# Patient Record
Sex: FEMALE | Race: WHITE | Employment: UNEMPLOYED | URBAN - METROPOLITAN AREA
[De-identification: names, ages, dates, MRNs, and addresses within clinical notes are randomized per-mention and may not be internally consistent; named-entity substitution may affect disease eponyms.]

---

## 2017-07-04 NOTE — ED INITIAL ASSESSMENT (HPI)
Pt states she has had sciatica pain on and off for two years. Worse over the last few weeks, seen at [facility] recently. Pt takes motrin without relief. Scheduled to start PT 7/5/17.

## 2017-07-04 NOTE — ED PROVIDER NOTES
Patient Seen in: [facility] Emergency Department    History   Patient presents with:  Back Pain (musculoskeletal)    Stated Complaint: back pain, radiates down leg    HPI    Patient is a 21-year-old with history of back pain and obesity who presents stated in HPI.     Physical Exam   ED Triage Vitals [07/04/17 0127]  BP: 124/67  Pulse: 72  Resp: 16  Temp: 98 °F (36.7 °C)  Temp src: Temporal  SpO2: 96 %  O2 Device: None (Room air)    Current:/67   Pulse 72   Temp 98 °F (36.7 °C) (Temporal)   Resp taking these medications    predniSONE 10 MG Oral Tab  60mg QD x 3d, 40mg QD x 3d, 20mg QD x 3 days  Qty: 27 tablet Refills: 0

## 2018-01-10 NOTE — ED NOTES
Pt has had her abscess drained in November. Pt received antibiotics IV and pt refused to admit at that time. Pt has been on 2 rounds of antibiotics. Pt doesn't know if she had a fever at home.   Pt has a golf ball size abscess to the middle of her chest.

## 2018-01-10 NOTE — ED PROVIDER NOTES
Patient Seen in: [facility] Emergency Department    History   Patient presents with:  Abscess (integumentary)    Stated Complaint: ABSCESS    HPI    60-year-old presents for evaluation of a painful mass in her left chest area.   It started about 6 we size area of erythema purulence fluctuance to the left upper medial breast/chest without surrounding erythema or crepitus. No pointing. Respiratory: No distress patient speaks in full sentences  Extremities: No edema.   Neuro: Speech is normal.  No facial

## 2018-01-10 NOTE — ED INITIAL ASSESSMENT (HPI)
Pt states has an abscess to her chest since November, states was treated with antibiotics and improved. Pt states worse since Saturday.

## 2019-10-21 NOTE — ED INITIAL ASSESSMENT (HPI)
Patient complaining of redness to the abdominal folds x 1 week, with gradually increasing discomfort.

## 2019-10-21 NOTE — ED PROVIDER NOTES
Patient Seen in: [facility] Emergency Department      History   Patient presents with:  Abscess (integumentary)  Eval-G (genital)    Stated Complaint: eval g, abscess    HPI    27-year-old woman who frequently gets abscesses on her body.   She compla diagnosis)    Disposition:  Discharge  10/20/2019  8:14 pm    Follow-up:  Isabel Garcia MD  [address] [place] [address]  [address] [facility] [address]  [phone]    Schedule an appointment as soon as possible for a visit in 2 days          Medications Prescribed:  Cur

## 2019-11-14 NOTE — ED PROVIDER NOTES
Patient Seen in: [facility] Emergency Department      History   Patient presents with:  Abdomen/Flank Pain (GI/)    Stated Complaint: abd pain/ n/v    HPI    58-year-old female with a history of morbid obesity, presents to the emergency department Course     Labs Reviewed   COMP METABOLIC PANEL (14) - Abnormal; Notable for the following components:       Result Value    A/G Ratio 0.9 (*)     All other components within normal limits   LIPASE - Normal   POCT PREGNANCY, URINE   RAINBOW DRAW BLUE   [facility] referral contact information. She will be discharged home with Prilosec and Carafate. Recommend avoiding NSAIDs if possible. Also recommend follow-up with her primary care physician. MDM       Patient was screened and evaluated during this visit.

## 2019-11-14 NOTE — ED NOTES
Bedside report received from Jayesh Arauz, [facility]. Patient and family updated on plan of care, no questions at this time.

## 2019-11-14 NOTE — ED INITIAL ASSESSMENT (HPI)
Patient here with reflux pain that gets worse while eating and drinking. Patient saw her PMD and was instructed to take antacids. Patient states she vomited once 2 days ago.

## 2023-06-05 ENCOUNTER — HOSPITAL ENCOUNTER (EMERGENCY)
Age: 33
Discharge: HOME | End: 2023-06-05
Attending: EMERGENCY MEDICINE
Payer: COMMERCIAL

## 2023-06-05 VITALS
OXYGEN SATURATION: 99 % | BODY MASS INDEX: 50.02 KG/M2 | DIASTOLIC BLOOD PRESSURE: 86 MMHG | HEIGHT: 64 IN | SYSTOLIC BLOOD PRESSURE: 153 MMHG | WEIGHT: 293 LBS | RESPIRATION RATE: 16 BRPM | HEART RATE: 79 BPM

## 2023-06-05 DIAGNOSIS — B02.9 HERPES ZOSTER WITHOUT COMPLICATION: Primary | ICD-10-CM

## 2023-06-05 PROCEDURE — 99283 EMERGENCY DEPT VISIT LOW MDM: CPT

## 2023-06-05 RX ORDER — HYDROCODONE BITARTRATE AND ACETAMINOPHEN 5; 325 MG/1; MG/1
1 TABLET ORAL EVERY 6 HOURS PRN
Qty: 20 TABLET | Refills: 0 | Status: SHIPPED | OUTPATIENT
Start: 2023-06-05

## 2023-06-05 RX ORDER — GABAPENTIN 100 MG/1
100 CAPSULE ORAL 3 TIMES DAILY
Qty: 30 CAPSULE | Refills: 0 | Status: SHIPPED | OUTPATIENT
Start: 2023-06-05

## 2023-06-05 RX ORDER — PREDNISONE 50 MG/1
50 TABLET ORAL DAILY
Qty: 5 TABLET | Refills: 0 | Status: SHIPPED | OUTPATIENT
Start: 2023-06-05

## 2023-06-05 RX ORDER — ACYCLOVIR 800 MG/1
800 TABLET ORAL 4 TIMES DAILY
Qty: 40 TABLET | Refills: 0 | Status: SHIPPED | OUTPATIENT
Start: 2023-06-05

## 2023-06-05 NOTE — ED INITIAL ASSESSMENT (HPI)
Has itchy, burning rash in three spots, small of back.  rigth buttock and right inner thigh x 1 week

## 2023-10-27 ENCOUNTER — HOSPITAL ENCOUNTER (EMERGENCY)
Age: 33
Discharge: HOME | End: 2023-10-27
Payer: COMMERCIAL

## 2023-10-27 ENCOUNTER — HOSPITAL ENCOUNTER (EMERGENCY)
Age: 33
Discharge: LEFT BEFORE DISCHARGE | End: 2023-10-27
Payer: COMMERCIAL

## 2023-10-27 VITALS
SYSTOLIC BLOOD PRESSURE: 129 MMHG | HEART RATE: 89 BPM | HEIGHT: 64 IN | OXYGEN SATURATION: 96 % | TEMPERATURE: 98 F | RESPIRATION RATE: 18 BRPM | WEIGHT: 293 LBS | DIASTOLIC BLOOD PRESSURE: 73 MMHG | BODY MASS INDEX: 50.02 KG/M2

## 2023-10-27 LAB
B-HCG UR QL: NEGATIVE
BILIRUB UR QL STRIP.AUTO: NEGATIVE
CLARITY UR REFRACT.AUTO: CLEAR
COLOR UR AUTO: YELLOW
GLUCOSE UR STRIP.AUTO-MCNC: NEGATIVE MG/DL
KETONES UR STRIP.AUTO-MCNC: NEGATIVE MG/DL
LEUKOCYTE ESTERASE UR QL STRIP.AUTO: NEGATIVE
NITRITE UR QL STRIP.AUTO: NEGATIVE
PH UR STRIP.AUTO: 6 [PH] (ref 5–8)
PROT UR STRIP.AUTO-MCNC: >=300 MG/DL
SP GR UR STRIP.AUTO: >=1.03 (ref 1–1.03)
UROBILINOGEN UR STRIP.AUTO-MCNC: 0.2 MG/DL

## 2023-10-27 PROCEDURE — 81025 URINE PREGNANCY TEST: CPT

## 2023-10-27 PROCEDURE — 81015 MICROSCOPIC EXAM OF URINE: CPT | Performed by: EMERGENCY MEDICINE

## 2023-10-27 PROCEDURE — 81001 URINALYSIS AUTO W/SCOPE: CPT | Performed by: EMERGENCY MEDICINE

## 2023-10-27 NOTE — ED INITIAL ASSESSMENT (HPI)
Pt to ed with multiple complaints. Pt states it her abdomen hurts  and is nauseas after she eats, lower back pain, and urinary frequency.  Pt states she urinated a small amount on herself twice tonight

## 2023-11-03 ENCOUNTER — HOSPITAL ENCOUNTER (EMERGENCY)
Age: 33
Discharge: HOME | End: 2023-11-03
Attending: EMERGENCY MEDICINE
Payer: COMMERCIAL

## 2023-11-03 VITALS
OXYGEN SATURATION: 99 % | BODY MASS INDEX: 57 KG/M2 | WEIGHT: 293 LBS | DIASTOLIC BLOOD PRESSURE: 90 MMHG | HEART RATE: 88 BPM | TEMPERATURE: 99 F | SYSTOLIC BLOOD PRESSURE: 156 MMHG | RESPIRATION RATE: 16 BRPM

## 2023-11-03 DIAGNOSIS — M54.50 BILATERAL LOW BACK PAIN WITHOUT SCIATICA, UNSPECIFIED CHRONICITY: Primary | ICD-10-CM

## 2023-11-03 PROCEDURE — 99283 EMERGENCY DEPT VISIT LOW MDM: CPT

## 2023-11-03 PROCEDURE — 96372 THER/PROPH/DIAG INJ SC/IM: CPT

## 2023-11-03 RX ORDER — DIAZEPAM 10 MG/1
10 TABLET ORAL ONCE
Status: COMPLETED | OUTPATIENT
Start: 2023-11-03 | End: 2023-11-03

## 2023-11-03 RX ORDER — KETOROLAC TROMETHAMINE 15 MG/ML
30 INJECTION, SOLUTION INTRAMUSCULAR; INTRAVENOUS ONCE
Status: COMPLETED | OUTPATIENT
Start: 2023-11-03 | End: 2023-11-03

## 2023-11-03 RX ORDER — TRAMADOL HYDROCHLORIDE 50 MG/1
100 TABLET ORAL ONCE
Status: COMPLETED | OUTPATIENT
Start: 2023-11-03 | End: 2023-11-03

## 2023-11-03 RX ORDER — PREDNISONE 20 MG/1
40 TABLET ORAL ONCE
Status: COMPLETED | OUTPATIENT
Start: 2023-11-03 | End: 2023-11-03

## 2023-11-03 RX ORDER — TRAMADOL HYDROCHLORIDE 50 MG/1
TABLET ORAL EVERY 6 HOURS PRN
Qty: 10 TABLET | Refills: 0 | Status: SHIPPED | OUTPATIENT
Start: 2023-11-03 | End: 2023-11-08

## 2023-11-03 RX ORDER — METHYLPREDNISOLONE 4 MG/1
TABLET ORAL
Qty: 21 TABLET | Refills: 0 | Status: SHIPPED | OUTPATIENT
Start: 2023-11-03

## 2023-11-03 RX ORDER — DIAZEPAM 5 MG/1
5 TABLET ORAL 3 TIMES DAILY PRN
Qty: 10 TABLET | Refills: 0 | Status: SHIPPED | OUTPATIENT
Start: 2023-11-03 | End: 2023-11-10

## 2023-11-03 NOTE — DISCHARGE INSTRUCTIONS
Please follow-up with your primary care physician 1-2 days return to the ER if your symptoms worsen progress or if you have any further concerns. Please take Motrin 600 mg or 6 hours needed for pain control. Please take valium as prescribed as needed for muscle relaxer for back pain. Take tramadol only for severe pain. DO NOT TAKE VALIUM OR TRAMADOL and drive or operate heavy machinery as it will make you drowsy. Discuss physical therapy with your primary care physician. You can also follow-up with pain management see if there is any further treatments for your back pain.

## 2024-03-27 ENCOUNTER — HOSPITAL ENCOUNTER (EMERGENCY)
Age: 34
Discharge: HOME | End: 2024-03-27
Attending: EMERGENCY MEDICINE
Payer: COMMERCIAL

## 2024-03-27 VITALS
DIASTOLIC BLOOD PRESSURE: 71 MMHG | TEMPERATURE: 99 F | HEIGHT: 64 IN | HEART RATE: 84 BPM | OXYGEN SATURATION: 97 % | RESPIRATION RATE: 16 BRPM | WEIGHT: 293 LBS | BODY MASS INDEX: 50.02 KG/M2 | SYSTOLIC BLOOD PRESSURE: 144 MMHG

## 2024-03-27 DIAGNOSIS — R94.31 PROLONGED QT INTERVAL: ICD-10-CM

## 2024-03-27 DIAGNOSIS — R42 VERTIGO: Primary | ICD-10-CM

## 2024-03-27 LAB
ALBUMIN SERPL-MCNC: 3.1 G/DL (ref 3.4–5)
ALBUMIN/GLOB SERPL: 0.8 {RATIO} (ref 1–2)
ALP LIVER SERPL-CCNC: 70 U/L
ALT SERPL-CCNC: 24 U/L
ANION GAP SERPL CALC-SCNC: 5 MMOL/L (ref 0–18)
AST SERPL-CCNC: 13 U/L (ref 15–37)
BASOPHILS # BLD AUTO: 0.05 X10(3) UL (ref 0–0.2)
BASOPHILS NFR BLD AUTO: 0.4 %
BILIRUB SERPL-MCNC: 0.3 MG/DL (ref 0.1–2)
BUN BLD-MCNC: 14 MG/DL (ref 9–23)
CALCIUM BLD-MCNC: 8.5 MG/DL (ref 8.5–10.1)
CHLORIDE SERPL-SCNC: 104 MMOL/L (ref 98–112)
CO2 SERPL-SCNC: 28 MMOL/L (ref 21–32)
CREAT BLD-MCNC: 0.9 MG/DL
EGFRCR SERPLBLD CKD-EPI 2021: 87 ML/MIN/1.73M2 (ref 60–?)
EOSINOPHIL # BLD AUTO: 0.2 X10(3) UL (ref 0–0.7)
EOSINOPHIL NFR BLD AUTO: 1.8 %
ERYTHROCYTE [DISTWIDTH] IN BLOOD BY AUTOMATED COUNT: 12.8 %
GLOBULIN PLAS-MCNC: 3.7 G/DL (ref 2.8–4.4)
GLUCOSE BLD-MCNC: 130 MG/DL (ref 70–99)
HCT VFR BLD AUTO: 35.3 %
HGB BLD-MCNC: 11.6 G/DL
IMM GRANULOCYTES # BLD AUTO: 0.05 X10(3) UL (ref 0–1)
IMM GRANULOCYTES NFR BLD: 0.4 %
LYMPHOCYTES # BLD AUTO: 3.12 X10(3) UL (ref 1–4)
LYMPHOCYTES NFR BLD AUTO: 27.7 %
MCH RBC QN AUTO: 29.8 PG (ref 26–34)
MCHC RBC AUTO-ENTMCNC: 32.9 G/DL (ref 31–37)
MCV RBC AUTO: 90.7 FL
MONOCYTES # BLD AUTO: 0.61 X10(3) UL (ref 0.1–1)
MONOCYTES NFR BLD AUTO: 5.4 %
NEUTROPHILS # BLD AUTO: 7.23 X10 (3) UL (ref 1.5–7.7)
NEUTROPHILS # BLD AUTO: 7.23 X10(3) UL (ref 1.5–7.7)
NEUTROPHILS NFR BLD AUTO: 64.3 %
OSMOLALITY SERPL CALC.SUM OF ELEC: 286 MOSM/KG (ref 275–295)
PLATELET # BLD AUTO: 359 10(3)UL (ref 150–450)
POCT INFLUENZA A: NEGATIVE
POCT INFLUENZA B: NEGATIVE
POTASSIUM SERPL-SCNC: 3.5 MMOL/L (ref 3.5–5.1)
PROT SERPL-MCNC: 6.8 G/DL (ref 6.4–8.2)
RBC # BLD AUTO: 3.89 X10(6)UL
SARS-COV-2 RNA RESP QL NAA+PROBE: NOT DETECTED
SODIUM SERPL-SCNC: 137 MMOL/L (ref 136–145)
TROPONIN I SERPL HS-MCNC: 21 NG/L
WBC # BLD AUTO: 11.3 X10(3) UL (ref 4–11)

## 2024-03-27 PROCEDURE — 93005 ELECTROCARDIOGRAM TRACING: CPT

## 2024-03-27 PROCEDURE — 85025 COMPLETE CBC W/AUTO DIFF WBC: CPT | Performed by: EMERGENCY MEDICINE

## 2024-03-27 PROCEDURE — 80053 COMPREHEN METABOLIC PANEL: CPT | Performed by: EMERGENCY MEDICINE

## 2024-03-27 PROCEDURE — 99284 EMERGENCY DEPT VISIT MOD MDM: CPT

## 2024-03-27 PROCEDURE — 84484 ASSAY OF TROPONIN QUANT: CPT | Performed by: EMERGENCY MEDICINE

## 2024-03-27 PROCEDURE — 87502 INFLUENZA DNA AMP PROBE: CPT | Performed by: EMERGENCY MEDICINE

## 2024-03-27 PROCEDURE — 96374 THER/PROPH/DIAG INJ IV PUSH: CPT

## 2024-03-27 PROCEDURE — 87502 INFLUENZA DNA AMP PROBE: CPT

## 2024-03-27 PROCEDURE — 96361 HYDRATE IV INFUSION ADD-ON: CPT

## 2024-03-27 PROCEDURE — 93010 ELECTROCARDIOGRAM REPORT: CPT

## 2024-03-27 RX ORDER — MECLIZINE HYDROCHLORIDE 25 MG/1
25 TABLET ORAL ONCE
Status: COMPLETED | OUTPATIENT
Start: 2024-03-27 | End: 2024-03-27

## 2024-03-27 RX ORDER — ONDANSETRON 2 MG/ML
4 INJECTION INTRAMUSCULAR; INTRAVENOUS ONCE
Status: COMPLETED | OUTPATIENT
Start: 2024-03-27 | End: 2024-03-27

## 2024-03-27 RX ORDER — MECLIZINE HYDROCHLORIDE 25 MG/1
25 TABLET ORAL 3 TIMES DAILY PRN
Qty: 21 TABLET | Refills: 0 | Status: SHIPPED | OUTPATIENT
Start: 2024-03-27 | End: 2024-04-03

## 2024-03-27 NOTE — ED INITIAL ASSESSMENT (HPI)
Vomiting since 12 with dizziness. Patient tried to sleep it off, vomiting has subsided but dizziness continued. Attempted to make appt with PMD but told to come here.

## 2024-03-28 NOTE — ED PROVIDER NOTES
Patient Seen in: [facility] [place]      History     Chief Complaint   Patient presents with    Nausea/Vomiting/Diarrhea    Dizziness     Stated Complaint: around 12 vomiting, and dizziness    Subjective:   HPI    34-year-old woman here for evaluation of dizziness nausea vomiting.  States she was feeling well, ate lunch was sitting at her desk at work, began to feel dizzy described as spinning sensation off balance.  Reports she had severe nausea associated with her symptoms, did have 1 episode nonbloody nonbilious vomiting symptoms provoked with head movement better she states [place]ley still closes her eyes denies focal weakness numbness falls or injuries fevers headache vision changes, chest pain shortness of breath any other complaints.  Has had similar symptoms in the past felt somewhat different.  No other complaints at this time.    Objective:   Past Medical History:   Diagnosis Date    PCOS (polycystic ovarian syndrome)               Past Surgical History:   Procedure Laterality Date    EXCIS LUMBAR DISK,ONE LEVEL      PRIOR CLASSICAL                   Social History     Socioeconomic History    Marital status: Single   Tobacco Use    Smoking status: Passive Smoke Exposure - Never Smoker    Smokeless tobacco: Never   Vaping Use    Vaping Use: Never used   Substance and Sexual Activity    Alcohol use: Yes     Comment: social    Drug use: Never              Review of Systems    Positive for stated complaint: around 12 vomiting, and dizziness  Other systems are as noted in HPI.  Constitutional and vital signs reviewed.      All other systems reviewed and negative except as noted above.    Physical Exam     ED Triage Vitals [24 1845]   BP (!) 145/104   Pulse 90   Resp 16   Temp 98.9 °F (37.2 °C)   Temp src Oral   SpO2 99 %   O2 Device None (Room air)       Current:/71   Pulse 84   Temp 98.9 °F (37.2 °C) (Oral)   Resp 16   Ht 162.6 cm (5' 4\")   Wt (!) 154.2 kg    LMP 03/09/2024 (Exact Date)   SpO2 97%   BMI 58.36 kg/m²         Physical Exam      Physical Exam  Vitals signs and nursing note reviewed.   General: Young woman sitting in the bed in no acute distress  Head: Normocephalic and atraumatic.   HEENT:  Mucous membranes are moist.   Cardiovascular:  Normal rate and regular rhythm.  No Edema  Pulmonary:  Pulmonary effort is normal.  Normal breath sounds. No wheezing, rhonchi or rales.   Abdominal: Soft nontender nondistended, normal bowel sounds, no guarding no rebound tenderness  Skin: Warm and dry  Neurological: Awake alert, speech is normal, normal coordination motor 5/5 in bilateral upper and lower extremities.  sensation is grossly intact throughout.  No facial asymmetry.  Stable narrow based gait.    ED Course     Labs Reviewed   COMP METABOLIC PANEL (14) - Abnormal; Notable for the following components:       Result Value    Glucose 130 (*)     AST 13 (*)     Albumin 3.1 (*)     A/G Ratio 0.8 (*)     All other components within normal limits   CBC W/ DIFFERENTIAL - Abnormal; Notable for the following components:    WBC 11.3 (*)     HGB 11.6 (*)     All other components within normal limits   TROPONIN I HIGH SENSITIVITY - Normal   RAPID SARS-COV-2 BY PCR - Normal   POCT FLU TEST - Normal    Narrative:     This assay is a rapid molecular in vitro test utilizing nucleic acid amplification of influenza A and B viral RNA.   CBC WITH DIFFERENTIAL WITH PLATELET    Narrative:     The following orders were created for panel order CBC With Differential With Platelet.  Procedure                               Abnormality         Status                     ---------                               -----------         ------                     CBC W/ DIFFERENTIAL[[id number]]          Abnormal            Final result                 Please view results for these tests on the individual orders.     EKG    Rate, intervals and axes as noted on EKG Report.  Rate: 79  Rhythm: Sinus  Rhythm  Reading: No acute ischemic changes, prolonged QT QTc is 477 previously was 467 on [facility] EKG from 3/23.                       MDM                                         Medical Decision Making  This is a 34-year-old woman here for evaluation of dizziness.  Differential includes cardiac dysrhythmia electrolyte abnormality, peripheral versus central vertigo.  Patient is afebrile hemodynamically stable, EKG normal sinus rhythm basic labs unremarkable.  Her symptoms are provoked with head movement better when she keeps her head still they were sudden onset they are associated with vomiting diarrhea.  She did receive IV fluids, meclizine with significant improvement in her symptoms has a stable narrow-base gait is feeling much better.  I believe her symptoms are consistent with peripheral vertigo, recommend close follow-up with PMD for further evaluation stay well-hydrated meclizine as needed.  Incidentally noted to have slightly prolonged QT interval at 477 compared to prior of 467 when reviewing previous EKG from [facility].  She will follow-up with her primary doctor as well as cardiology for further evaluation she takes no prescription medications daily aside from metformin, we discussed the possible implications of prolonged QT, she understands return precautions as we have discussed we discussed    Problems Addressed:  Prolonged QT interval: acute illness or injury  Vertigo: acute illness or injury    Amount and/or Complexity of Data Reviewed  Labs: ordered. Decision-making details documented in ED Course.  ECG/medicine tests: ordered and independent interpretation performed. Decision-making details documented in ED Course.    Risk  Prescription drug management.        Disposition and Plan     Clinical Impression:  1. Vertigo    2. Prolonged QT interval         Disposition:  Discharge  3/27/2024  9:46 pm    Follow-up:  Haseeb Smith MD  [address]  [facility] [address]  [phone]    Follow  up  Follow-up with your primary doctor or at the clinic listed for further evaluation.  Return to the emergency department immediately if your symptoms do not continue to improve or if you have any new concerning symptoms.    NICOLASA MAE  [address] [facility] [address] [phone]  Schedule an appointment as soon as possible for a visit in 1 day(s)  The QT interval was slightly prolonged on your EKG, this should be further evaluated by cardiology.  Please schedule appointment to be evaluated within the next 1 to 2 weeks.  Return to ER if symptoms change or if you develop any new concerning symptoms.          Medications Prescribed:  Discharge Medication List as of 3/27/2024  9:55 PM        START taking these medications    Details   meclizine 25 MG Oral Tab Take 1 tablet (25 mg total) by mouth 3 (three) times daily as needed., Normal, Disp-21 tablet, R-0

## 2024-03-31 LAB
ATRIAL RATE: 79 BPM
P AXIS: 23 DEGREES
P-R INTERVAL: 166 MS
Q-T INTERVAL: 416 MS
QRS DURATION: 86 MS
QTC CALCULATION (BEZET): 477 MS
R AXIS: -3 DEGREES
T AXIS: -2 DEGREES
VENTRICULAR RATE: 79 BPM

## 2025-01-09 ENCOUNTER — HOSPITAL ENCOUNTER (EMERGENCY)
Age: 35
Discharge: HOME | End: 2025-01-09
Attending: EMERGENCY MEDICINE
Payer: COMMERCIAL

## 2025-01-09 VITALS
DIASTOLIC BLOOD PRESSURE: 73 MMHG | TEMPERATURE: 99 F | WEIGHT: 293 LBS | BODY MASS INDEX: 50.02 KG/M2 | SYSTOLIC BLOOD PRESSURE: 126 MMHG | HEIGHT: 64 IN | OXYGEN SATURATION: 99 % | RESPIRATION RATE: 16 BRPM | HEART RATE: 88 BPM

## 2025-01-09 DIAGNOSIS — L03.311 CELLULITIS OF ABDOMINAL WALL: Primary | ICD-10-CM

## 2025-01-09 PROCEDURE — 87077 CULTURE AEROBIC IDENTIFY: CPT | Performed by: EMERGENCY MEDICINE

## 2025-01-09 PROCEDURE — 99283 EMERGENCY DEPT VISIT LOW MDM: CPT

## 2025-01-09 PROCEDURE — 87070 CULTURE OTHR SPECIMN AEROBIC: CPT | Performed by: EMERGENCY MEDICINE

## 2025-01-09 PROCEDURE — 99284 EMERGENCY DEPT VISIT MOD MDM: CPT

## 2025-01-09 PROCEDURE — 96372 THER/PROPH/DIAG INJ SC/IM: CPT

## 2025-01-09 PROCEDURE — 87205 SMEAR GRAM STAIN: CPT | Performed by: EMERGENCY MEDICINE

## 2025-01-09 RX ORDER — LABETALOL 100 MG/1
100 TABLET, FILM COATED ORAL 2 TIMES DAILY
COMMUNITY
Start: 2024-11-25

## 2025-01-09 RX ORDER — CEPHALEXIN 500 MG/1
500 CAPSULE ORAL 4 TIMES DAILY
Qty: 40 CAPSULE | Refills: 0 | Status: SHIPPED | OUTPATIENT
Start: 2025-01-09 | End: 2025-01-19

## 2025-01-09 RX ORDER — SULFAMETHOXAZOLE AND TRIMETHOPRIM 800; 160 MG/1; MG/1
1 TABLET ORAL 2 TIMES DAILY
Qty: 20 TABLET | Refills: 0 | Status: SHIPPED | OUTPATIENT
Start: 2025-01-09 | End: 2025-01-19

## 2025-01-10 NOTE — DISCHARGE INSTRUCTIONS
Start Keflex and Bactrim tonight.  Wound culture results should be available by Saturday to guide further antibiotic treatment.  Return here if the redness spreads prior to this or if the area becomes painful or more swollen

## 2025-01-10 NOTE — ED PROVIDER NOTES
Patient Seen in: [facility] In [place]      History     Chief Complaint   Patient presents with    Fever    Abscess     Stated Complaint: Boil to lower abdomen that is draining \"blood clots\" fever of 101 earlier that *    Subjective:   HPI      34-year-old female presents for evaluation of a draining wound to her right abdominal wall with a fever of 101 earlier today.  Patient noted the draining wound only today.  It is below the fold in her lower abdomen.  There is no significant pain in the area.  Patient has no nausea or vomiting.  She does have a history of recurrent boils in this area    Objective:     Past Medical History:    Essential hypertension    PCOS (polycystic ovarian syndrome)              Past Surgical History:   Procedure Laterality Date    Excis lumbar disk,one level      Prior classical                   Social History     Socioeconomic History    Marital status: Single   Tobacco Use    Smoking status: Passive Smoke Exposure - Never Smoker    Smokeless tobacco: Never   Vaping Use    Vaping status: Never Used   Substance and Sexual Activity    Alcohol use: Yes     Comment: social    Drug use: Never     Social Drivers of Health     Food Insecurity: No Food Insecurity (2024)    Received from [facility]    Food Insecurity     Currently or in the past 3 months, have you worried your food would run out before you had money to buy more?: No     In the past 12 months, have you run out of food or been unable to get more?: No   Transportation Needs: No Transportation Needs (2024)    Received from [facility]    Transportation Needs     Currently or in the past 3 months, has lack of transportation kept you from medical appointments, getting food or medicine, or providing care to a family member?: Unrecognized value     Medical Transportation Needs?: No    Received from [facility], [facility]    Social Connections    Received from [facility]    Housing Stability                  Physical Exam     ED Triage Vitals [01/09/25 2025]   /73   Pulse 88   Resp 16   Temp 98.8 °F (37.1 °C)   Temp src Oral   SpO2 99 %   O2 Device None (Room air)       Current Vitals:   Vital Signs  BP: 126/73  Pulse: 88  Resp: 16  Temp: 98.8 °F (37.1 °C)  Temp src: Oral    Oxygen Therapy  SpO2: 99 %  O2 Device: None (Room air)        Physical Exam     General: Alert, oriented, no apparent distress  HEENT:  Pupils equal reactive.  Extraocular motions intact. MMM.  Neck: Supple  Lungs: Clear to auscultation bilaterally.  Heart: Regular rate and rhythm.  Abdomen: Soft, nontender.   Skin: There is a small superficial draining ulcer/boil to the right lower abdominal wall underneath the pannus with some surrounding erythema/induration tracking medially.  Neurologic: No focal neurologic deficits.  Normal speech pattern  Musculoskeletal: No tenderness or deformity noted.  Full range of motion throughout.      ED Course     Labs Reviewed   AEROBIC BACTERIAL CULTURE                   MDM      Differential diagnosis includes abscess, cellulitis, fungal infection    On exam there appears to be an abdominal wall cellulitis that is stemming from a small ruptured boil underneath the pannus.  This wound was cultured.  Patient will be empirically started on Keflex and Bactrim pending culture results.    She was told to return here if the redness spreads over the next 1 to 2 days.    Medical Decision Making      Disposition and Plan     Clinical Impression:  1. Cellulitis of abdominal wall         Disposition:  Discharge  1/9/2025  9:22 pm    Follow-up:  Kofi Giordano MD  [address] [place] [id number] [phone]    Call in 3 day(s)  For wound re-check          Medications Prescribed:  Current Discharge Medication List        START taking these medications    Details   cephALEXin 500 MG Oral  Cap Take 1 capsule (500 mg total) by mouth 4 (four) times daily for 10 days.  Qty: 40 capsule, Refills: 0      sulfamethoxazole-trimethoprim -160 MG Oral Tab per tablet Take 1 tablet by mouth 2 (two) times daily for 10 days.  Qty: 20 tablet, Refills: 0                 Supplementary Documentation:

## 2025-01-10 NOTE — ED INITIAL ASSESSMENT (HPI)
Pt to ED with abscess under right abdominal fold, +fevers. Pt states it started draining blood today.

## 2025-03-23 ENCOUNTER — HOSPITAL ENCOUNTER (INPATIENT)
Facility: HOSPITAL | Age: 35
LOS: 3 days | Discharge: HOME | End: 2025-03-27
Attending: EMERGENCY MEDICINE | Admitting: HOSPITALIST
Payer: COMMERCIAL

## 2025-03-23 ENCOUNTER — APPOINTMENT (OUTPATIENT)
Dept: CT IMAGING | Age: 35
End: 2025-03-23
Attending: EMERGENCY MEDICINE
Payer: COMMERCIAL

## 2025-03-23 DIAGNOSIS — R51.9 ACUTE INTRACTABLE HEADACHE, UNSPECIFIED HEADACHE TYPE: Primary | ICD-10-CM

## 2025-03-23 DIAGNOSIS — A87.9: ICD-10-CM

## 2025-03-23 LAB
ADENOVIRUS PCR:: NOT DETECTED
ALBUMIN SERPL-MCNC: 4.4 G/DL (ref 3.2–4.8)
ALBUMIN/GLOB SERPL: 1.3 {RATIO} (ref 1–2)
ALP LIVER SERPL-CCNC: 58 U/L
ALT SERPL-CCNC: 15 U/L
ANION GAP SERPL CALC-SCNC: 7 MMOL/L (ref 0–18)
AST SERPL-CCNC: 13 U/L (ref ?–34)
B PARAPERT DNA SPEC QL NAA+PROBE: NOT DETECTED
B PERT DNA SPEC QL NAA+PROBE: NOT DETECTED
B-HCG UR QL: NEGATIVE
BASOPHILS # BLD AUTO: 0.05 X10(3) UL (ref 0–0.2)
BASOPHILS NFR BLD AUTO: 0.5 %
BASOPHILS NFR CSF: 0 %
BILIRUB SERPL-MCNC: 0.5 MG/DL (ref 0.3–1.2)
BUN BLD-MCNC: 11 MG/DL (ref 9–23)
C PNEUM DNA SPEC QL NAA+PROBE: NOT DETECTED
CALCIUM BLD-MCNC: 10 MG/DL (ref 8.7–10.6)
CHLORIDE SERPL-SCNC: 103 MMOL/L (ref 98–112)
CLARITY CSF: CLEAR
CLARITY CSF: CLEAR
CO2 SERPL-SCNC: 28 MMOL/L (ref 21–32)
COLOR CSF: COLORLESS
COLOR CSF: COLORLESS
CORONAVIRUS 229E PCR:: NOT DETECTED
CORONAVIRUS HKU1 PCR:: NOT DETECTED
CORONAVIRUS NL63 PCR:: NOT DETECTED
CORONAVIRUS OC43 PCR:: NOT DETECTED
COUNT PERFORMED ON TUBE: 1
COUNT PERFORMED ON TUBE: 4
CREAT BLD-MCNC: 0.91 MG/DL
EGFRCR SERPLBLD CKD-EPI 2021: 85 ML/MIN/1.73M2 (ref 60–?)
EOSINOPHIL # BLD AUTO: 0.06 X10(3) UL (ref 0–0.7)
EOSINOPHIL NFR BLD AUTO: 0.5 %
EOSINOPHIL NFR CSF: 1 %
ERYTHROCYTE [DISTWIDTH] IN BLOOD BY AUTOMATED COUNT: 12.7 %
FLUAV RNA SPEC QL NAA+PROBE: NOT DETECTED
FLUBV RNA SPEC QL NAA+PROBE: NOT DETECTED
GLOBULIN PLAS-MCNC: 3.3 G/DL (ref 2–3.5)
GLUCOSE BLD-MCNC: 102 MG/DL (ref 70–99)
GLUCOSE CSF-MCNC: 51 MG/DL (ref 40–70)
HCT VFR BLD AUTO: 38.2 %
HGB BLD-MCNC: 12.8 G/DL
IMM GRANULOCYTES # BLD AUTO: 0.03 X10(3) UL (ref 0–1)
IMM GRANULOCYTES NFR BLD: 0.3 %
LYMPHOCYTES # BLD AUTO: 3.56 X10(3) UL (ref 1–4)
LYMPHOCYTES NFR BLD AUTO: 32.3 %
LYMPHOCYTES NFR CSF: 16 %
MCH RBC QN AUTO: 30.2 PG (ref 26–34)
MCHC RBC AUTO-ENTMCNC: 33.5 G/DL (ref 31–37)
MCV RBC AUTO: 90.1 FL
METAPNEUMOVIRUS PCR:: NOT DETECTED
MONOCYTES # BLD AUTO: 0.85 X10(3) UL (ref 0.1–1)
MONOCYTES NFR BLD AUTO: 7.7 %
MONOS+MACROS NFR CSF: 7 %
MYCOPLASMA PNEUMONIA PCR:: NOT DETECTED
NEUTROPHILS # BLD AUTO: 6.46 X10 (3) UL (ref 1.5–7.7)
NEUTROPHILS # BLD AUTO: 6.46 X10(3) UL (ref 1.5–7.7)
NEUTROPHILS NFR BLD AUTO: 58.7 %
NEUTROPHILS NFR CSF: 76 %
OSMOLALITY SERPL CALC.SUM OF ELEC: 286 MOSM/KG (ref 275–295)
PARAINFLUENZA 1 PCR:: NOT DETECTED
PARAINFLUENZA 2 PCR:: NOT DETECTED
PARAINFLUENZA 3 PCR:: NOT DETECTED
PARAINFLUENZA 4 PCR:: NOT DETECTED
PLATELET # BLD AUTO: 344 10(3)UL (ref 150–450)
POTASSIUM SERPL-SCNC: 3.9 MMOL/L (ref 3.5–5.1)
PROT PATTERN CSF ELPH-IMP: 40.1 MG/DL (ref 15–45)
PROT SERPL-MCNC: 7.7 G/DL (ref 5.7–8.2)
RBC # BLD AUTO: 4.24 X10(6)UL
RBC # CSF: 34 /MM3 (ref ?–1)
RBC # CSF: 60 /MM3
RHINOVIRUS/ENTERO PCR:: NOT DETECTED
RSV RNA SPEC QL NAA+PROBE: NOT DETECTED
SARS-COV-2 RNA NPH QL NAA+NON-PROBE: NOT DETECTED
SODIUM SERPL-SCNC: 138 MMOL/L (ref 136–145)
TOTAL CELLS COUNTED CSF: 165 /MM3 (ref 0–5)
TOTAL CELLS COUNTED FLD: 100
TOTAL VOLUME CSF: 3 ML
WBC # BLD AUTO: 11 X10(3) UL (ref 4–11)
WBC # CSF: 165 /MM3

## 2025-03-23 PROCEDURE — 99223 1ST HOSP IP/OBS HIGH 75: CPT | Performed by: HOSPITALIST

## 2025-03-23 PROCEDURE — 70450 CT HEAD/BRAIN W/O DYE: CPT | Performed by: EMERGENCY MEDICINE

## 2025-03-23 PROCEDURE — 009U3ZX DRAINAGE OF SPINAL CANAL, PERCUTANEOUS APPROACH, DIAGNOSTIC: ICD-10-PCS | Performed by: EMERGENCY MEDICINE

## 2025-03-23 RX ORDER — ACETAMINOPHEN 325 MG/1
650 TABLET ORAL EVERY 4 HOURS PRN
Status: DISCONTINUED | OUTPATIENT
Start: 2025-03-23 | End: 2025-03-27

## 2025-03-23 RX ORDER — BUTALBITAL, ACETAMINOPHEN AND CAFFEINE 50; 325; 40 MG/1; MG/1; MG/1
1 TABLET ORAL EVERY 4 HOURS PRN
Status: DISCONTINUED | OUTPATIENT
Start: 2025-03-23 | End: 2025-03-24

## 2025-03-23 RX ORDER — KETOROLAC TROMETHAMINE 15 MG/ML
15 INJECTION, SOLUTION INTRAMUSCULAR; INTRAVENOUS EVERY 6 HOURS PRN
Status: DISCONTINUED | OUTPATIENT
Start: 2025-03-23 | End: 2025-03-24

## 2025-03-23 RX ORDER — HYDROCODONE BITARTRATE AND ACETAMINOPHEN 5; 325 MG/1; MG/1
2 TABLET ORAL EVERY 4 HOURS PRN
Status: DISCONTINUED | OUTPATIENT
Start: 2025-03-23 | End: 2025-03-27

## 2025-03-23 RX ORDER — LOSARTAN POTASSIUM 50 MG/1
100 TABLET ORAL DAILY
COMMUNITY
Start: 2025-01-20

## 2025-03-23 RX ORDER — SENNOSIDES 8.6 MG
17.2 TABLET ORAL NIGHTLY PRN
Status: DISCONTINUED | OUTPATIENT
Start: 2025-03-23 | End: 2025-03-27

## 2025-03-23 RX ORDER — HYDROMORPHONE HYDROCHLORIDE 1 MG/ML
INJECTION, SOLUTION INTRAMUSCULAR; INTRAVENOUS; SUBCUTANEOUS
Status: COMPLETED
Start: 2025-03-23 | End: 2025-03-23

## 2025-03-23 RX ORDER — BISACODYL 10 MG
10 SUPPOSITORY, RECTAL RECTAL
Status: DISCONTINUED | OUTPATIENT
Start: 2025-03-23 | End: 2025-03-27

## 2025-03-23 RX ORDER — DIPHENHYDRAMINE HYDROCHLORIDE 50 MG/ML
25 INJECTION, SOLUTION INTRAMUSCULAR; INTRAVENOUS ONCE
Status: COMPLETED | OUTPATIENT
Start: 2025-03-23 | End: 2025-03-23

## 2025-03-23 RX ORDER — PROCHLORPERAZINE EDISYLATE 5 MG/ML
5 INJECTION INTRAMUSCULAR; INTRAVENOUS EVERY 8 HOURS PRN
Status: DISCONTINUED | OUTPATIENT
Start: 2025-03-23 | End: 2025-03-24

## 2025-03-23 RX ORDER — KETOROLAC TROMETHAMINE 30 MG/ML
30 INJECTION, SOLUTION INTRAMUSCULAR; INTRAVENOUS EVERY 6 HOURS PRN
Status: DISCONTINUED | OUTPATIENT
Start: 2025-03-23 | End: 2025-03-24

## 2025-03-23 RX ORDER — ONDANSETRON 2 MG/ML
4 INJECTION INTRAMUSCULAR; INTRAVENOUS EVERY 6 HOURS PRN
Status: DISCONTINUED | OUTPATIENT
Start: 2025-03-23 | End: 2025-03-27

## 2025-03-23 RX ORDER — LABETALOL 100 MG/1
100 TABLET, FILM COATED ORAL
Status: DISCONTINUED | OUTPATIENT
Start: 2025-03-23 | End: 2025-03-27

## 2025-03-23 RX ORDER — METOCLOPRAMIDE HYDROCHLORIDE 5 MG/ML
10 INJECTION INTRAMUSCULAR; INTRAVENOUS ONCE
Status: COMPLETED | OUTPATIENT
Start: 2025-03-23 | End: 2025-03-23

## 2025-03-23 RX ORDER — HYDROMORPHONE HYDROCHLORIDE 1 MG/ML
0.5 INJECTION, SOLUTION INTRAMUSCULAR; INTRAVENOUS; SUBCUTANEOUS ONCE
Status: COMPLETED | OUTPATIENT
Start: 2025-03-23 | End: 2025-03-23

## 2025-03-23 RX ORDER — POLYETHYLENE GLYCOL 3350 17 G/17G
17 POWDER, FOR SOLUTION ORAL DAILY PRN
Status: DISCONTINUED | OUTPATIENT
Start: 2025-03-23 | End: 2025-03-27

## 2025-03-23 RX ORDER — SODIUM CHLORIDE 9 MG/ML
INJECTION, SOLUTION INTRAVENOUS ONCE
Status: COMPLETED | OUTPATIENT
Start: 2025-03-23 | End: 2025-03-23

## 2025-03-23 RX ORDER — HYDROCODONE BITARTRATE AND ACETAMINOPHEN 5; 325 MG/1; MG/1
1 TABLET ORAL EVERY 4 HOURS PRN
Status: DISCONTINUED | OUTPATIENT
Start: 2025-03-23 | End: 2025-03-27

## 2025-03-23 RX ORDER — SODIUM PHOSPHATE, DIBASIC AND SODIUM PHOSPHATE, MONOBASIC 7; 19 G/230ML; G/230ML
1 ENEMA RECTAL ONCE AS NEEDED
Status: DISCONTINUED | OUTPATIENT
Start: 2025-03-23 | End: 2025-03-27

## 2025-03-23 RX ORDER — SODIUM CHLORIDE 9 MG/ML
INJECTION, SOLUTION INTRAVENOUS CONTINUOUS
Status: ACTIVE | OUTPATIENT
Start: 2025-03-23 | End: 2025-03-23

## 2025-03-23 RX ORDER — LOSARTAN POTASSIUM 100 MG/1
100 TABLET ORAL DAILY
Status: DISCONTINUED | OUTPATIENT
Start: 2025-03-24 | End: 2025-03-27

## 2025-03-23 RX ORDER — SODIUM CHLORIDE 9 MG/ML
INJECTION, SOLUTION INTRAVENOUS CONTINUOUS
Status: DISCONTINUED | OUTPATIENT
Start: 2025-03-23 | End: 2025-03-27

## 2025-03-23 NOTE — ED QUICK NOTES
Orders for admission, patient is aware of plan and ready to go upstairs. Any questions, please call ED RN Mony at extension [phone].     Patient Covid vaccination status: Unvaccinated     COVID Test Ordered in ED: None    COVID Suspicion at Admission: N/A    Running Infusions:      Mental Status/LOC at time of transport: A/Ox4    Other pertinent information: pt will arrive via ambulance.     CIWA score: N/A   NIH score:  N/A

## 2025-03-23 NOTE — H&P
[facility]  History and Physical     Denton Collado Patient Status:  Emergency    1990 MRN [id number]   Location [facility] IN [place] Attending Joe Agee MD   Hosp Day # 0 PCP Kofi Giordano MD     Chief Complaint: Headache    Subjective:    History of Present Illness:     Denton Collado is a 35 year old female with medical history of hypertension and PCOS who presents to the ER with complaints of a headache for the last several days.  She reports one day she felt like she had chills but never had a fever, chest pain, shortness of breath or vision changes.  She reporst she hasn't had a migraine in a long while.  Currently her headache is much better and she has some mild nausea. She denies abdominal pain, diarrhea, recent travel or sick contacts.    History/Other:    Past Medical History:  Past Medical History:    Essential hypertension    PCOS (polycystic ovarian syndrome)     Past Surgical History:   Past Surgical History:   Procedure Laterality Date    Excis lumbar disk,one level      Prior classical         Family History:   No family history on file.  Social History:    reports that she is a non-smoker but has been exposed to tobacco smoke. She has never used smokeless tobacco. She reports current alcohol use. She reports that she does not use drugs.     Allergies: Allergies[1]    Medications:  Medications Ordered Prior to Encounter[2]    Review of Systems:   A comprehensive review of systems was completed.    Pertinent positives and negatives noted in the HPI.    Objective:   Physical Exam:    /50   Pulse 50   Temp 98.4 °F (36.9 °C) (Oral)   Resp 16   LMP  (LMP Unknown)   SpO2 96%   General: No acute distress, Alert  Respiratory: No rhonchi, no wheezes  Cardiovascular: S1, S2. Regular rate and rhythm  Abdomen: Soft, Non-tender, non-distended, positive bowel sounds  Neuro: No new focal deficits  Extremities: No edema      Results:     Labs:      Labs Last 24 Hours:    Recent Labs   Lab 03/23/25  1047   RBC 4.24   HGB 12.8   HCT 38.2   MCV 90.1   MCH 30.2   MCHC 33.5   RDW 12.7   NEPRELIM 6.46   WBC 11.0   .0       Recent Labs   Lab 03/23/25  1047   *   BUN 11   CREATSERUM 0.91   EGFRCR 85   CA 10.0   ALB 4.4      K 3.9      CO2 28.0   ALKPHO 58   AST 13   ALT 15   BILT 0.5   TP 7.7       Estimated Glomerular Filtration Rate: 85 mL/min/1.73m2 (result from lab).    No results found for: \"PT\", \"INR\"    No results for input(s): \"TROP\", \"TROPHS\", \"CK\" in the last 168 hours.    No results for input(s): \"TROP\", \"PBNP\" in the last 168 hours.    No results for input(s): \"PCT\" in the last 168 hours.    Imaging: Imaging data reviewed in Epic.    Assessment & Plan:      #Intractable headache  -Migraine HA  -add fiorecet  -s/p LP  -Cx pending  -CT head negative for acute changes  -pain control  -antiemetics    #hypertension  -controlled  -resume losartan, labetalol    #Morbid Obesity   -BMI 59.22    Plan of care discussed with patient    Sarai Jerez MD    Supplementary Documentation:     The 21st Century Cures Act makes medical notes like these available to patients in the interest of transparency. Please be advised this is a medical document. Medical documents are intended to carry relevant information, facts as evident, and the clinical opinion of the practitioner. The medical note is intended as peer to peer communication and may appear blunt or direct. It is written in medical language and may contain abbreviations or verbiage that are unfamiliar.                                       [1]   Allergies  Allergen Reactions    Lisinopril Coughing   [2]   Current Facility-Administered Medications on File Prior to Encounter   Medication Dose Route Frequency Provider Last Rate Last Admin    [COMPLETED] ceFAZolin (Ancef) 1 g in sterile water for injection (PF) IM syringe  1 g Intramuscular Once Yuli Saldana MD   1 g at  25     Current Outpatient Medications on File Prior to Encounter   Medication Sig Dispense Refill    losartan 50 MG Oral Tab Take 2 tablets (100 mg total) by mouth daily.      labetalol 100 MG Oral Tab Take 1 tablet (100 mg total) by mouth 2 (two) times daily.      [] cephALEXin 500 MG Oral Cap Take 1 capsule (500 mg total) by mouth 4 (four) times daily for 10 days. 40 capsule 0    [] sulfamethoxazole-trimethoprim -160 MG Oral Tab per tablet Take 1 tablet by mouth 2 (two) times daily for 10 days. 20 tablet 0    methylPREDNISolone 4 MG Oral Tablet Therapy Pack Take as directed on dose pack instructions (Patient not taking: Reported on 3/27/2024) 21 tablet 0    acyclovir 800 MG Oral Tab Take 1 tablet (800 mg total) by mouth 4 (four) times daily. (Patient not taking: Reported on 3/27/2024) 40 tablet 0    predniSONE 50 MG Oral Tab Take 1 tablet (50 mg total) by mouth daily. (Patient not taking: Reported on 3/27/2024) 5 tablet 0    gabapentin 100 MG Oral Cap Take 1 capsule (100 mg total) by mouth 3 (three) times daily. (Patient not taking: Reported on 3/27/2024) 30 capsule 0    HYDROcodone-acetaminophen 5-325 MG Oral Tab Take 1 tablet by mouth every 6 (six) hours as needed for Pain. Do not drive or operate machinery within 8 hours of taking this medication (Patient not taking: Reported on 3/27/2024) 20 tablet 0    sucralfate 1 g Oral Tab Take 1 tablet (1 g total) by mouth 4 (four) times daily before meals and nightly. (Patient not taking: Reported on 2023) 60 tablet 0

## 2025-03-23 NOTE — ED PROVIDER NOTES
Patient Seen in: [facility] In [place]      History     Chief Complaint   Patient presents with    Headache     Stated Complaint: HA    Subjective:   HPI      Patient presents to the Emergency Department with a 48-hour history of ongoing headache.  The patient states that 2 days ago, she felt a rather sudden onset of headache along with nausea and vomiting.  She went to a local immediate care where she was treated with pain medication, but her symptoms continue to persist.  She had no imaging performed at that time.  She has had no history of acute trauma, fever or chills.  Patient has no change in cognition, or focal motor or sensory deficits.    Objective:     Past Medical History:    Essential hypertension    PCOS (polycystic ovarian syndrome)              Past Surgical History:   Procedure Laterality Date    Excis lumbar disk,one level      Prior classical                   Social History     Socioeconomic History    Marital status:    Tobacco Use    Smoking status: Passive Smoke Exposure - Never Smoker    Smokeless tobacco: Never   Vaping Use    Vaping status: Never Used   Substance and Sexual Activity    Alcohol use: Yes     Comment: social    Drug use: Never     Social Drivers of Health     Food Insecurity: No Food Insecurity (3/23/2025)    NCSS - Food Insecurity     Worried About Running Out of Food in the Last Year: No     Ran Out of Food in the Last Year: No   Transportation Needs: No Transportation Needs (3/23/2025)    NCSS - Transportation     Lack of Transportation: No   Housing Stability: Not At Risk (3/23/2025)    NCSS - Housing/Utilities     Has Housing: Yes     Worried About Losing Housing: No     Unable to Get Utilities: No                  Physical Exam     ED Triage Vitals [25 1039]   /73   Pulse 79   Resp 20   Temp 98.2 °F (36.8 °C)   Temp src Oral   SpO2 98 %   O2 Device None (Room air)       Current Vitals:   Vital Signs  BP: 133/62  Pulse:  63  Resp: 14  Temp: 98.4 °F (36.9 °C)  Temp src: Oral  MAP (mmHg): 78    Oxygen Therapy  SpO2: 96 %  O2 Device: None (Room air)  Pulse Oximetry Type: Continuous  Oximetry Probe Site Changed: No  Pulse Ox Probe Location: Left hand        Physical Exam  Vitals and nursing note reviewed.   Constitutional:       Appearance: She is well-developed.   HENT:      Head: Normocephalic.   Cardiovascular:      Rate and Rhythm: Normal rate and regular rhythm.      Heart sounds: Normal heart sounds. No murmur heard.  Pulmonary:      Effort: Pulmonary effort is normal. No respiratory distress.      Breath sounds: Normal breath sounds.   Abdominal:      General: Bowel sounds are normal.      Palpations: Abdomen is soft.      Tenderness: There is no abdominal tenderness. There is no rebound.   Musculoskeletal:         General: No tenderness. Normal range of motion.      Cervical back: Normal range of motion and neck supple.   Lymphadenopathy:      Cervical: No cervical adenopathy.   Skin:     General: Skin is warm and dry.      Findings: No rash.   Neurological:      Mental Status: She is alert and oriented to person, place, and time.      Sensory: No sensory deficit.      Comments: Patient has normal speech and cognition.  No cerebellar signs are noted.  No focal or lateralizing motor or sensory deficits are noted.            ED Course     Labs Reviewed   COMP METABOLIC PANEL (14) - Abnormal; Notable for the following components:       Result Value    Glucose 102 (*)     All other components within normal limits   CELL COUNT, CSF - Abnormal; Notable for the following components:    TNC,  (*)     RBC CSF 34 (*)     All other components within normal limits   GLUCOSE, CEREBROSPINAL FLUID - Normal   PROTEIN, TOTAL, CSF - Normal   POCT PREGNANCY URINE - Normal   RESPIRATORY FLU EXPAND PANEL + COVID-19 - Normal    Narrative:     This test is intended for the simultaneous qualitative detection and differentiation of nucleic acids  from multiple viral and bacterial respiratory organisms, including nucleic acid from Severe Acute Respiratory Syndrome Coronavirus 2 (SARS-CoV-2) in nasopharyngeal swab from individuals suspected of respiratory viral infection consistent with COVID-19 by their healthcare provider.    Test performed using the BioC4M Respiratory Panel 2.1 (RP2.1) assay on the [employer] 2.0 System, [employer], [employer], [place] [address].    This test is being used under the Food and Drug Administration's Emergency Use Authorization.    The authorized Fact Sheet for Healthcare Providers for this assay is available upon request from the laboratory.    SARS and MERS coronaviruses are not tested on this assay.   CBC WITH DIFFERENTIAL WITH PLATELET   CSF RBC TUBE 1   CELLCOUNT/DIFF CSF   PATH COMMENT CSF   MENINGITIS ENCEPHALITIS PANEL BY PCR   BASIC METABOLIC PANEL (8)   RAINBOW DRAW LAVENDER   RAINBOW DRAW LIGHT GREEN   CSF CULTURE       ED Course as of 03/24/25 0706  ------------------------------------------------------------  Time: 03/23 1123  Value: Comp Metabolic Panel (14)(!)  Comment: Unremarkable    ------------------------------------------------------------  Time: 03/23 1123  Value: CBC With Differential With Platelet  Comment: Unremarkable    ------------------------------------------------------------  Time: 03/23 1212  Value: POCT urine pregnancy: Negative  Comment: (Reviewed)  ------------------------------------------------------------  Time: 03/23 1251  Value: CT BRAIN OR HEAD (CPT=70450)  Comment: Images were independently viewed by me and no evidence of hemorrhage was noted.  No other acute intracranial abnormality noted.  ------------------------------------------------------------  Time: 03/23 1960  Comment: Because of the abruptness of the patient's headache that occurred 2 days ago, I felt that evaluation for subarachnoid hemorrhage was indicated.  Spinal tap was performed as a result of this.    The  patient requires an LP to rule out subarachnoid hemorrhage.  Informed consent was obtained.  The standard timeout procedure was completed.  The patient was prepped and draped in sterile fashion.  Landmarks were identified.  Local anesthesia was achieved with 1% Lidocaine.  A 3.5 inch 22 gauge spinal needed was inserted into a lumbar interspinous space and spinal fluid was drained.  Approximately 4 ml's of CSF were obtained and sent to the lab.  The fluid appeared clear.  The needle was withdrawn.  There were no complications related to the procedure and the patient tolerated the procedure well.      ------------------------------------------------------------  Time: 03/23 1505  Value: Glucose CSF: 51  Comment: (Reviewed)  ------------------------------------------------------------  Time: 03/23 1505  Value: Total Protein CSF: 40.1  Comment: (Reviewed)  ------------------------------------------------------------  Time: 03/23 1530  Value: Cell Count/Diff CSF  Comment: Patient is noted to have a CSF white count of 165 with neutrophils of 76 and lymphocytes of 16%.       Patient continued to have headache, requiring repeated doses of pain medication.  Given the patient's CSF results, the patient was hospitalized       MDM      Patient comes to the Emergency Department with acute headache which has been persistent over the past 2 days.  Differential diagnosis includes intracranial hemorrhage, meningitis, migraine headache.  Patient initially had improvement of headache with Reglan and Benadryl.  CT scan did not show evidence of intracranial hemorrhage.  However, because of the abruptness of the headache that patient was experiencing, an LP was performed to evaluate for possible subarachnoid hemorrhage.  This did not reveal a significant mount of blood, but was noted to have a small amount of leukocytes.  Patient continued to require pain medication as her headache returned.  Because of this and the spinal tap results,  the patient was hospitalized for acute management of her headache and presumed viral meningitis.    Admission disposition: 3/23/2025  4:35 PM           Medical Decision Making      Disposition and Plan     Clinical Impression:  1. Acute intractable headache, unspecified headache type    2. Viral meningitis (HCC)         Disposition:  Admit  3/23/2025  4:35 pm      Supplementary Documentation:         Hospital Problems       Present on Admission             ICD-10-CM Noted POA    * (Principal) Acute nonintractable headache, unspecified headache type R51.9 3/23/2025 Unknown    Viral meningitis (HCC) A87.9 3/23/2025 Unknown

## 2025-03-24 ENCOUNTER — APPOINTMENT (OUTPATIENT)
Dept: CT IMAGING | Facility: HOSPITAL | Age: 35
End: 2025-03-24
Attending: Other
Payer: COMMERCIAL

## 2025-03-24 PROBLEM — E66.01 MORBID OBESITY (HCC): Status: ACTIVE | Noted: 2025-03-24

## 2025-03-24 PROBLEM — R51.9 ACUTE INTRACTABLE HEADACHE, UNSPECIFIED HEADACHE TYPE: Status: ACTIVE | Noted: 2025-03-24

## 2025-03-24 LAB
ANION GAP SERPL CALC-SCNC: 11 MMOL/L (ref 0–18)
BUN BLD-MCNC: 12 MG/DL (ref 9–23)
CALCIUM BLD-MCNC: 8.9 MG/DL (ref 8.7–10.6)
CHLORIDE SERPL-SCNC: 105 MMOL/L (ref 98–112)
CO2 SERPL-SCNC: 24 MMOL/L (ref 21–32)
CREAT BLD-MCNC: 0.92 MG/DL
CYTOMEGALOVIRUS (CMV): NOT DETECTED
CYTOMEGALOVIRUS (CMV): NOT DETECTED
EGFRCR SERPLBLD CKD-EPI 2021: 84 ML/MIN/1.73M2 (ref 60–?)
ENTEROVIRUS (EV): NOT DETECTED
ENTEROVIRUS (EV): NOT DETECTED
ESCHERICHIA COLI K1: NOT DETECTED
ESCHERICHIA COLI K1: NOT DETECTED
GLUCOSE BLD-MCNC: 94 MG/DL (ref 70–99)
HAEMOPHILUS INFLUENZAE: NOT DETECTED
HAEMOPHILUS INFLUENZAE: NOT DETECTED
HERPES SIMPLEX VIRUS 1 (HSV-1): NOT DETECTED
HERPES SIMPLEX VIRUS 1 (HSV-1): NOT DETECTED
HERPES SIMPLEX VIRUS 2 (HSV-2): NOT DETECTED
HERPES SIMPLEX VIRUS 2 (HSV-2): NOT DETECTED
HUMAN HERPESVIRUS 6 (HHV-6): NOT DETECTED
HUMAN HERPESVIRUS 6 (HHV-6): NOT DETECTED
HUMAN PARECHOVIRUS (HPEV): NOT DETECTED
HUMAN PARECHOVIRUS (HPEV): NOT DETECTED
LISTERIA MONOCYTOGENES: NOT DETECTED
LISTERIA MONOCYTOGENES: NOT DETECTED
NEISSERIA MENINGIDITIS: NOT DETECTED
NEISSERIA MENINGIDITIS: NOT DETECTED
NS CRYPTOCOCCUS (C. NEOFORMANS/C. GATTII): NOT DETECTED
OSMOLALITY SERPL CALC.SUM OF ELEC: 290 MOSM/KG (ref 275–295)
POTASSIUM SERPL-SCNC: 3.7 MMOL/L (ref 3.5–5.1)
SODIUM SERPL-SCNC: 140 MMOL/L (ref 136–145)
STREPTOCOCCUS AGALACTIAE: NOT DETECTED
STREPTOCOCCUS AGALACTIAE: NOT DETECTED
STREPTOCOCCUS PNEUMONIAE: NOT DETECTED
STREPTOCOCCUS PNEUMONIAE: NOT DETECTED
VARICELLA ZOSTER VIRUS (VZV): NOT DETECTED
VARICELLA ZOSTER VIRUS (VZV): NOT DETECTED

## 2025-03-24 PROCEDURE — 99223 1ST HOSP IP/OBS HIGH 75: CPT | Performed by: OTHER

## 2025-03-24 PROCEDURE — 70496 CT ANGIOGRAPHY HEAD: CPT | Performed by: OTHER

## 2025-03-24 PROCEDURE — 99232 SBSQ HOSP IP/OBS MODERATE 35: CPT | Performed by: HOSPITALIST

## 2025-03-24 RX ORDER — KETOROLAC TROMETHAMINE 15 MG/ML
15 INJECTION, SOLUTION INTRAMUSCULAR; INTRAVENOUS EVERY 8 HOURS
Status: COMPLETED | OUTPATIENT
Start: 2025-03-24 | End: 2025-03-25

## 2025-03-24 RX ORDER — METOCLOPRAMIDE HYDROCHLORIDE 5 MG/ML
10 INJECTION INTRAMUSCULAR; INTRAVENOUS EVERY 8 HOURS
Status: COMPLETED | OUTPATIENT
Start: 2025-03-24 | End: 2025-03-25

## 2025-03-24 RX ORDER — DEXAMETHASONE SODIUM PHOSPHATE 4 MG/ML
8 VIAL (ML) INJECTION EVERY 8 HOURS
Status: DISCONTINUED | OUTPATIENT
Start: 2025-03-24 | End: 2025-03-24

## 2025-03-24 RX ORDER — MAGNESIUM SULFATE HEPTAHYDRATE 40 MG/ML
2 INJECTION, SOLUTION INTRAVENOUS EVERY 8 HOURS
Status: COMPLETED | OUTPATIENT
Start: 2025-03-24 | End: 2025-03-25

## 2025-03-24 RX ORDER — DIPHENHYDRAMINE HYDROCHLORIDE 50 MG/ML
25 INJECTION, SOLUTION INTRAMUSCULAR; INTRAVENOUS EVERY 8 HOURS
Status: COMPLETED | OUTPATIENT
Start: 2025-03-24 | End: 2025-03-25

## 2025-03-24 NOTE — CONSULTS
INFECTIOUS DISEASE CONSULTATION    Denton Collado Patient Status:  Observation    1990 MRN [id number]   [facility] 4NW-A Attending Main Roberson MD   Hosp Day # 0 PCP Kofi Giordano MD       Requested by Dr. Agee    Reason for Consultation:  Suspected viral meningitis    History of Present Illness:  Denton Collado is a a(n) 35 year old female reports onset of headache on \"Friday\" 3/21, and had nausea and vomited.  She has had a persistent headache and came to ED on 3/23.   She underwent an LP that was consistent with viral meningitis.  She reports working at a [employer] center, no recent travel.  She lives at home with  and 15 year old.  No one else is known to have been sick.  She denies genital lesions.       History:  Past Medical History:    Essential hypertension    PCOS (polycystic ovarian syndrome)     Past Surgical History:   Procedure Laterality Date    Excis lumbar disk,one level      Prior classical        No family history on file.   reports that she is a non-smoker but has been exposed to tobacco smoke. She has never used smokeless tobacco. She reports current alcohol use. She reports that she does not use drugs.      Allergies:  Allergies[1]    Medications:    Current Facility-Administered Medications:     labetalol (Normodyne) tab 100 mg, 100 mg, Oral, 2x Daily(Beta Blocker)    losartan (Cozaar) tab 100 mg, 100 mg, Oral, Daily    sodium chloride 0.9% infusion, , Intravenous, Continuous    polyethylene glycol (PEG 3350) (Miralax) 17 g oral packet 17 g, 17 g, Oral, Daily PRN    sennosides (Senokot) tab 17.2 mg, 17.2 mg, Oral, Nightly PRN    bisacodyl (Dulcolax) 10 MG rectal suppository 10 mg, 10 mg, Rectal, Daily PRN    fleet enema (Fleet) rectal enema 133 mL, 1 enema, Rectal, Once PRN    ondansetron (Zofran) 4 MG/2ML injection 4 mg, 4 mg, Intravenous, Q6H PRN    prochlorperazine (Compazine) 10 MG/2ML  injection 5 mg, 5 mg, Intravenous, Q8H PRN    ketorolac (Toradol) 15 MG/ML injection 15 mg, 15 mg, Intravenous, Q6H PRN **OR** ketorolac (Toradol) 30 MG/ML injection 30 mg, 30 mg, Intravenous, Q6H PRN    acetaminophen (Tylenol) tab 650 mg, 650 mg, Oral, Q4H PRN **OR** HYDROcodone-acetaminophen (Norco) 5-325 MG per tab 1 tablet, 1 tablet, Oral, Q4H PRN **OR** HYDROcodone-acetaminophen (Norco) 5-325 MG per tab 2 tablet, 2 tablet, Oral, Q4H PRN    butalbital-acetaminophen-caffeine (Fioricet) -40 MG per tab 1 tablet, 1 tablet, Oral, Q4H PRN  Medications Ordered Prior to Encounter[2]    Review of Systems:    A comprehensive 10 point review of systems was completed.  Pertinent positives and negatives noted in the the HPI.      Physical Exam:    General: No acute distress. Alert and oriented x 3.  Vital signs: Temp:  [98.2 °F (36.8 °C)-98.5 °F (36.9 °C)] 98.4 °F (36.9 °C)  Pulse:  [50-79] 63  Resp:  [14-22] 14  BP: (104-136)/(50-79) 133/62  SpO2:  [96 %-98 %] 96 %  Body mass index is 59.22 kg/m².  HEENT: Moist mucous membranes. Extraocular muscles are intact.  Neck: No swelling, no masses  Respiratory: Non labored, symmetric exursion  Cardiovascular: No irregularities in rhythm  Abdomen: Soft, nontender, nondistended.    Musculoskeletal: Full range of motion of all extremities.  No swelling noted.  Joints: no effusions  Skin: No lesions. No erythema, no open wounds    Laboratory Data:  Laboratory data reviewed      Recent Labs   Lab 03/23/25  1047   RBC 4.24   HGB 12.8   HCT 38.2   MCV 90.1   MCH 30.2   MCHC 33.5   RDW 12.7   NEPRELIM 6.46   WBC 11.0   .0       Recent Labs   Lab 03/23/25  1047 03/24/25  0627   * 94   BUN 11 12   CREATSERUM 0.91 0.92   CA 10.0 8.9   ALB 4.4  --     140   K 3.9 3.7    105   CO2 28.0 24.0   ALKPHO 58  --    AST 13  --    ALT 15  --    BILT 0.5  --    TP 7.7  --                 Microbiologic Data:   Hospital Encounter on 03/23/25   1. CSF culture     Status:  None (Preliminary result)    Collection Time: 03/23/25  2:18 PM    Specimen: CSF, lumbar puncture; Cerebral spinal fluid   Result Value Ref Range    CSF Culture Result No Growth at <18 hours N/A    CSF Smear This is a cytocentrifuged smear. N/A    CSF Smear 4+ WBCs seen N/A    CSF Smear No organisms seen N/A     Cell Count/Diff CSF [[id number]] Collected: 03/23/25 1418   Specimen: Cerebral spinal fluid Updated: 03/23/25 1612    WBC Calculated,  /mm3     Neutrophils CSF 76 %     Lymphocytes CSF 16 %     Mono/Macrophages CSF 7 %     Eosinophils CSF 1 %     Basophil CSF 0 %     Total Cells Counted 100   Cell Count, CSF [[id number]] (Abnormal) Collected: 03/23/25 1418   Specimen: Cerebral spinal fluid Updated: 03/23/25 1535    Total Volume CSF 3.0 mL     CSF TUBE # 4    Color CSF Colorless    Clarity CSF Clear    TNC,   /mm3     RBC CSF 34  /mm3    Glucose, Cerebrospinal Fluid [[id number]] (Normal) Collected: 03/23/25 1418   Specimen: Cerebral spinal fluid Updated: 03/23/25 1504    Glucose CSF 51 mg/dL    Protein, Total, Csf [[id number]] (Normal) Collected: 03/23/25 1418   Specimen: Cerebral spinal fluid Updated: 03/23/25 1504    Total Protein CSF 40.1 mg/dL          Established Problem list:  Patient Active Problem List   Diagnosis    Acute nonintractable headache, unspecified headache type    Viral meningitis (HCC)       ASSESSMENT/PLAN:  1. Viral meningitis without signs of encephalitis    Symptoms of non resolving headache over the past 3 days, along with vomiting and diarrhea at the onset  -suspicious for Enterovirus  No genital lesions  -CSF showing 165 WBC, mixed neutrophils and lymphocytes, normal glucose and CSF/serum glucose ratio  PCR panel pending     Continue supportive care      2. 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 Obesity    4. HTN        Constantine Bae MD, MD  [facility]  [phone]         [1]    Allergies  Allergen Reactions    Lisinopril Coughing   [2]   Current Facility-Administered Medications on File Prior to Encounter   Medication Dose Route Frequency Provider Last Rate Last Admin    [COMPLETED] ceFAZolin (Ancef) 1 g in sterile water for injection (PF) IM syringe  1 g Intramuscular Once Yuli Saldana MD   1 g at 25 8224     Current Outpatient Medications on File Prior to Encounter   Medication Sig Dispense Refill    losartan 50 MG Oral Tab Take 2 tablets (100 mg total) by mouth daily. Reports taking 1 tablet daily as needed for hypertension      labetalol 100 MG Oral Tab Take 1 tablet (100 mg total) by mouth daily. Reports taking daily as needed      [] cephALEXin 500 MG Oral Cap Take 1 capsule (500 mg total) by mouth 4 (four) times daily for 10 days. 40 capsule 0    [] sulfamethoxazole-trimethoprim -160 MG Oral Tab per tablet Take 1 tablet by mouth 2 (two) times daily for 10 days. 20 tablet 0    methylPREDNISolone 4 MG Oral Tablet Therapy Pack Take as directed on dose pack instructions (Patient not taking: Reported on 3/27/2024) 21 tablet 0    acyclovir 800 MG Oral Tab Take 1 tablet (800 mg total) by mouth 4 (four) times daily. (Patient not taking: Reported on 3/27/2024) 40 tablet 0    predniSONE 50 MG Oral Tab Take 1 tablet (50 mg total) by mouth daily. (Patient not taking: Reported on 3/27/2024) 5 tablet 0    gabapentin 100 MG Oral Cap Take 1 capsule (100 mg total) by mouth 3 (three) times daily. (Patient not taking: Reported on 3/27/2024) 30 capsule 0    HYDROcodone-acetaminophen 5-325 MG Oral Tab Take 1 tablet by mouth every 6 (six) hours as needed for Pain. Do not drive or operate machinery within 8 hours of taking this medication (Patient not taking: Reported on 3/27/2024) 20 tablet 0    sucralfate 1 g Oral Tab Take 1 tablet (1 g total) by mouth 4 (four) times daily before meals and nightly. (Patient not taking: Reported on 2023) 60 tablet 0

## 2025-03-24 NOTE — CONSULTS
[facility]  [facility] Neurology Consult Note    Denton Collado Patient Status:  Observation    1990 MRN [id number]   Location [facility] 4NW-A Attending Main Roberson MD   Hosp Day # 0 PCP Kofi Giordano MD     REASON FOR EVALUATION:  Headache    HISTORY OF PRESENT ILLNESS:  Ms. Collado is a 35-year-old woman with morbid obesity and polycystic ovarian syndrome who came to the hospital due to intractable headache.  This started on Friday.  She was feeling nauseated after eating her lunch \"that did not sit well\" and, while she was vomiting, she had the sudden onset of very severe head pain.  She felt it in her neck and behind her eyes.  She does not think it was pulsatile nor did it make her sensitive to light or sound.  This was somewhat familiar to migrainous pain she has had in the past but this was much more severe than she has ever experienced before and she has not had a migrainous headache in over 10 years.  She sought emergency care over the weekend but her headache persisted in spite of typical care and, particularly as she started to develop neck pain, she was admitted for further workup including lumbar puncture for CSF analysis. Of note, she has pulsatile tinnitus on a constant basis for years, though this has not been worse of late.    PAST MEDICAL HISTORY:  Past Medical History:    Essential hypertension    PCOS (polycystic ovarian syndrome)       PAST SURGICAL HISTORY:  Past Surgical History:   Procedure Laterality Date    Excis lumbar disk,one level      Prior classical          FAMILY HISTORY:  family history is not on file.    SOCIAL HISTORY:   reports that she is a non-smoker but has been exposed to tobacco smoke. She has never used smokeless tobacco. She reports current alcohol use. She reports that she does not use drugs.    ALLERGIES:  Allergies   Allergen Reactions    Lisinopril Coughing       MEDICATIONS:  No current outpatient medications on file.     Current  Facility-Administered Medications   Medication Dose Route Frequency    dexamethasone (Decadron) 4 MG/ML injection 8 mg  8 mg Intravenous Q8H    ketorolac (Toradol) 15 MG/ML injection 15 mg  15 mg Intravenous Q8H    metoclopramide (Reglan) 5 mg/mL injection 10 mg  10 mg Intravenous Q8H    diphenhydrAMINE (Benadryl) 50 mg/mL  injection 25 mg  25 mg Intravenous Q8H    magnesium sulfate in sterile water for injection 2 g/50mL IVPB premix 2 g  2 g Intravenous Q8H    labetalol (Normodyne) tab 100 mg  100 mg Oral 2x Daily(Beta Blocker)    losartan (Cozaar) tab 100 mg  100 mg Oral Daily    sodium chloride 0.9% infusion   Intravenous Continuous    polyethylene glycol (PEG 3350) (Miralax) 17 g oral packet 17 g  17 g Oral Daily PRN    sennosides (Senokot) tab 17.2 mg  17.2 mg Oral Nightly PRN    bisacodyl (Dulcolax) 10 MG rectal suppository 10 mg  10 mg Rectal Daily PRN    fleet enema (Fleet) rectal enema 133 mL  1 enema Rectal Once PRN    ondansetron (Zofran) 4 MG/2ML injection 4 mg  4 mg Intravenous Q6H PRN    acetaminophen (Tylenol) tab 650 mg  650 mg Oral Q4H PRN    Or    HYDROcodone-acetaminophen (Norco) 5-325 MG per tab 1 tablet  1 tablet Oral Q4H PRN    Or    HYDROcodone-acetaminophen (Norco) 5-325 MG per tab 2 tablet  2 tablet Oral Q4H PRN       REVIEW OF SYSTEMS:  A 10-point system was reviewed.  Pertinent positives and negatives are noted in HPI.      PHYSICAL EXAMINATION:  VITAL SIGNS: /62 (BP Location: Right arm)   Pulse 63   Temp 98.4 °F (36.9 °C) (Oral)   Resp 14   Wt (!) 345 lb (156.5 kg)   LMP  (LMP Unknown)   SpO2 96%   BMI 59.22 kg/m²   GENERAL:  Patient is a 35 year old female in no acute distress.    No meningismus    NEUROLOGICAL:   Mental status: Oriented to person, place, and time  Speech & Language: Fluent, no dysarthria  Comprehension: Intact  Cranial Nerves: VFF, PERRL, EOMI, no nystagmus, facial sensation intact, face symmetric, tongue midline, shoulder shrug equal  Motor: tone, bulk,  strength are normal in all flexors and extensors   Sensory: Intact to light touch in all limbs  Coordination: FTN intact  Tendon Reflexes: normal for habitus, no asymmetry  Gait: Deferred    DIAGNOSTIC DATA:  Labs:  Recent Labs   Lab 03/23/25  1047   RBC 4.24   HGB 12.8   HCT 38.2   MCV 90.1   MCH 30.2   MCHC 33.5   RDW 12.7   NEPRELIM 6.46   WBC 11.0   .0         Recent Labs   Lab 03/23/25  1047 03/24/25  0627   * 94   BUN 11 12   CREATSERUM 0.91 0.92   EGFRCR 85 84   CA 10.0 8.9    140   K 3.9 3.7    105   CO2 28.0 24.0         IMAGING:  CT BRAIN OR HEAD (CPT=70450)    Result Date: 3/23/2025  CONCLUSION:  No acute intracranial abnormality.    LOCATION:  [id number]   Dictated by (CST): Salomon Garibay MD on 3/23/2025 at 12:18 PM     Finalized by (CST): Salomon Garibay MD on 3/23/2025 at 12:23 PM             ASSESSMENT:  Ms. Collado is a 35-year-old woman with probable viral meningitis with superimposed migrainous pain.    PLAN:  Principal Problem:    Acute nonintractable headache, unspecified headache type  Active Problems:    Viral meningitis (HCC)    Morbid obesity (HCC)  I recommend empiric antimicrobials, particularly antivirals, while we await CSF serologies, though defer to infectious disease consultant already on the case.    Migraine cocktails, 3 doses 8 hours apart: Dexamethasone, ketorolac, diphenhydramine, metoclopramide, magnesium sulfate.    Given the relationship to intense Valsalva, her morbid obesity and nearly constant pulsatile tinnitus I think vessel imaging will be of use to her, such that I will order a routine CTA/V of the brain.    We are following.    Jalen Ortiz MD

## 2025-03-24 NOTE — PROGRESS NOTES
NURSING ADMISSION NOTE      Patient admitted via Ambulance  Oriented to room.  Safety precautions initiated.  Bed in low position.  Call light in reach.    Patient admitted for headache for several days associated with nausea/ vomiting. Denies fevers. IVF infusing. Antiemetic given for nausea with good result. Prn pain med given with relief. Flu panel done.

## 2025-03-24 NOTE — PROGRESS NOTES
[facility]   part of [facility]     Hospitalist Progress Note     Denton Collado Patient Status:  Observation    1990 MRN [id number]   Location [facility] 4NW-A Attending Main Roberson MD   Hosp Day # 0 PCP Kofi Giordano MD     Subjective:   Still with bad HA.     Objective:    Review of Systems:   A comprehensive review of systems was completed; pertinent positive and negatives stated in subjective.  Vital signs:  Temp:  [98.2 °F (36.8 °C)-98.5 °F (36.9 °C)] 98.4 °F (36.9 °C)  Pulse:  [50-79] 63  Resp:  [14-22] 14  BP: (104-136)/(50-79) 133/62  SpO2:  [96 %-98 %] 96 %  Physical Exam:    General: No acute distress    Respiratory: no wheezes, no rhonchi  Cardiovascular: S1, S2, RRR  Abdomen: Soft, NT/ND, +BS  Extremities: no edema    Diagnostic Data:    Labs:  Recent Labs   Lab 25  1047   WBC 11.0   HGB 12.8   MCV 90.1   .0     Recent Labs   Lab 25  1047 25  0627   * 94   BUN 11 12   CREATSERUM 0.91 0.92   CA 10.0 8.9   ALB 4.4  --     140   K 3.9 3.7    105   CO2 28.0 24.0   ALKPHO 58  --    AST 13  --    ALT 15  --    BILT 0.5  --    TP 7.7  --      Estimated Creatinine Clearance: 74.4 mL/min (based on SCr of 0.92 mg/dL).  No results for input(s): \"PTP\", \"INR\" in the last 168 hours.     Microbiology  Hospital Encounter on 25   1. CSF culture     Status: None (Preliminary result)    Collection Time: 25  2:18 PM    Specimen: CSF, lumbar puncture; Cerebral spinal fluid   Result Value Ref Range    CSF Culture Result No Growth at <18 hours N/A    CSF Smear This is a cytocentrifuged smear. N/A    CSF Smear 4+ WBCs seen N/A    CSF Smear No organisms seen N/A     Imaging: Reviewed in Epic.  Medications:    dexamethasone  8 mg Intravenous Q8H    ketorolac  15 mg Intravenous Q8H    metoclopramide  10 mg Intravenous Q8H    diphenhydrAMINE  25 mg Intravenous Q8H    magnesium sulfate  2 g Intravenous Q8H    labetalol  100 mg Oral 2x  Daily(Beta Blocker)    losartan  100 mg Oral Daily       Assessment & Plan:    #Intractable headache- possible Migraine. Intractable and occurred days prior to LP yesterday   -add fiorecet  -Cx NGTD   -CT head negative for acute changes  -pain control  -antiemetics  -Neuro consult    -empiric meds per ID for viral meningitis     #HTN  #PCOS  #Morbid Obesity- BMI 59   #s/p recent renal bx- f/u with outpt nephrologist          Supplementary Documentation:   Quality:  DVT Mechanical Prophylaxis:   SCDs,    DVT Pharmacologic Prophylaxis   Medication   None                Code Status: Not on file  Hess: No urinary catheter in place  Hess Duration (in days):   Central line:    RANDI:   At this point Ms. Collado is expected to be discharge to: home     The 21st Century Cures Act makes medical notes like these available to patients in the interest of transparency. Please be advised this is a medical document. Medical documents are intended to carry relevant information, facts as evident, and the clinical opinion of the practitioner. The medical note is intended as peer to peer communication and may appear blunt or direct. It is written in medical language and may contain abbreviations or verbiage that are unfamiliar.

## 2025-03-24 NOTE — PROGRESS NOTES
Patient is alert and oriented x4. Room air. Medications given per MAR. Prn pain and antiemetic medication given with relief. IVF infusing. Neurology consulted. Safety precautions in place. Call light within reach.

## 2025-03-25 LAB — C DIFF TOX B STL QL: NEGATIVE

## 2025-03-25 PROCEDURE — 99232 SBSQ HOSP IP/OBS MODERATE 35: CPT | Performed by: HOSPITALIST

## 2025-03-25 PROCEDURE — 99232 SBSQ HOSP IP/OBS MODERATE 35: CPT

## 2025-03-25 NOTE — DISCHARGE INSTRUCTIONS
You can take a migraine cocktail at home as needed:    >>> Ibuprofen 400-600 mg (or Tylenol 500-1000 mg ) , Benadryl 25-50 mg , and a magnesium OTC 1 tab,  take with a full glass of cold water and lay in a dark room. May repeat every 6-8 hrs x 2 more doses if needed.

## 2025-03-25 NOTE — PLAN OF CARE
Pt received A&Ox4. VSS. Afebrile. C/o severe headache. PRN given with relief. All meds per MAR. IVF infusing @100mL/h. Up ambulating in room. Voiding. Family and pt updated on POC. Call light within reach.

## 2025-03-25 NOTE — PROGRESS NOTES
[facility]   [facility]     Hospitalist Progress Note     Denton Collado Patient Status:  Observation    1990 MRN [id number]   Location [facility] 4NW-A Attending Main Roberson MD   Hosp Day # 1 PCP Kofi Giordano MD     Subjective:   Feels little better     Objective:    Review of Systems:   A comprehensive review of systems was completed; pertinent positive and negatives stated in subjective.  Vital signs:  Temp:  [98 °F (36.7 °C)-98.5 °F (36.9 °C)] 98 °F (36.7 °C)  Pulse:  [50-57] 57  Resp:  [12-18] 12  BP: (100-109)/(50-80) 109/80  SpO2:  [95 %-99 %] 99 %  Physical Exam:    General: No acute distress    Respiratory: no wheezes, no rhonchi  Cardiovascular: S1, S2, RRR  Abdomen: Soft, NT/ND, +BS  Extremities: no edema    Diagnostic Data:    Labs:  Recent Labs   Lab 25  1047   WBC 11.0   HGB 12.8   MCV 90.1   .0     Recent Labs   Lab 25  1047 25  0627   * 94   BUN 11 12   CREATSERUM 0.91 0.92   CA 10.0 8.9   ALB 4.4  --     140   K 3.9 3.7    105   CO2 28.0 24.0   ALKPHO 58  --    AST 13  --    ALT 15  --    BILT 0.5  --    TP 7.7  --      Estimated Creatinine Clearance: 74.4 mL/min (based on SCr of 0.92 mg/dL).  No results for input(s): \"PTP\", \"INR\" in the last 168 hours.     Microbiology  Hospital Encounter on 25   1. CSF culture     Status: None (Preliminary result)    Collection Time: 25  2:18 PM    Specimen: CSF, lumbar puncture; Cerebral spinal fluid   Result Value Ref Range    CSF Culture Result No Growth 1 Day N/A    CSF Smear This is a cytocentrifuged smear. N/A    CSF Smear 4+ WBCs seen N/A    CSF Smear No organisms seen N/A     Imaging: Reviewed in Epic.  Medications:    ketorolac  15 mg Intravenous Q8H    metoclopramide  10 mg Intravenous Q8H    diphenhydrAMINE  25 mg Intravenous Q8H    magnesium sulfate  2 g Intravenous Q8H    labetalol  100 mg Oral 2x Daily(Beta Blocker)    losartan  100 mg Oral Daily        Assessment & Plan:    #Intractable headache- suspect viral meningitis- Intractable and occurred days prior to LP yesterday   -suspect enterovirus- PCR neg per ID- hold on antivirals  -supportive care   -cont analgesics  -Neuro and ID consulted    #HTN  #PCOS  #Morbid Obesity- BMI 59   #s/p recent renal bx IgA Nephropathy- f/u with outpt nephrologist          Supplementary Documentation:   Quality:  DVT Mechanical Prophylaxis:   SCDs,    DVT Pharmacologic Prophylaxis   Medication   None                Code Status: Not on file  Hess: No urinary catheter in place  Hess Duration (in days):   Central line:    RANDI:   At this point Ms. Collado is expected to be discharge to: home     The 21st Century Cures Act makes medical notes like these available to patients in the interest of transparency. Please be advised this is a medical document. Medical documents are intended to carry relevant information, facts as evident, and the clinical opinion of the practitioner. The medical note is intended as peer to peer communication and may appear blunt or direct. It is written in medical language and may contain abbreviations or verbiage that are unfamiliar.

## 2025-03-25 NOTE — PLAN OF CARE
Pt os alert and oriented x4. VSS on RA. Afebrile. All medications given per MAR. Reports relief with migraine cocktail.  at bedside. Call light within reach.

## 2025-03-25 NOTE — PROGRESS NOTES
[facility]  [facility] Neurology Progress Note    Denton Collado Patient Status:  Inpatient    1990 MRN [id number]   Location [facility] 4NW-A Attending Main Roberson MD   Hosp Day # 1 PCP Kofi Giordano MD     CC: Headache, viral meningitis    Subjective: Denton Collado is a 35 years old female with PMHx significant for morbid obesity, with BMI 59.2, PCOS, remote migrainous headaches, pulsatile tinnitus and HTN, who presented to the ED on 3/23 with a sudden onset of headache and neck pain. CT head in D was unremarkable. LP was done in ED, findings consistent with viral meningitis. Pt was admitted, Neurology was consulted. Started on migraine cocktail with dexamethasone, ketorolac, diphenhydramine, metoclopramide, and magnesium sulfate. CTA/CTV was ordered for further vessels investigation.     Pt seen for a follow up visit today. Currently resting in bed, headache has improved. Rated at 3-4/10, last migraine cocktail was given recently. Denies any nausea, light or noise sensitivity at this time, no new focal numbness/tingling or weakness.     MEDICATIONS:  No current outpatient medications on file.     Current Facility-Administered Medications   Medication Dose Route Frequency    magnesium sulfate in sterile water for injection 2 g/50mL IVPB premix 2 g  2 g Intravenous Q8H    labetalol (Normodyne) tab 100 mg  100 mg Oral 2x Daily(Beta Blocker)    losartan (Cozaar) tab 100 mg  100 mg Oral Daily    sodium chloride 0.9% infusion   Intravenous Continuous    polyethylene glycol (PEG 3350) (Miralax) 17 g oral packet 17 g  17 g Oral Daily PRN    sennosides (Senokot) tab 17.2 mg  17.2 mg Oral Nightly PRN    bisacodyl (Dulcolax) 10 MG rectal suppository 10 mg  10 mg Rectal Daily PRN    fleet enema (Fleet) rectal enema 133 mL  1 enema Rectal Once PRN    ondansetron (Zofran) 4 MG/2ML injection 4 mg  4 mg Intravenous Q6H PRN    acetaminophen (Tylenol) tab 650 mg  650 mg Oral Q4H PRN    Or     HYDROcodone-acetaminophen (Norco) 5-325 MG per tab 1 tablet  1 tablet Oral Q4H PRN    Or    HYDROcodone-acetaminophen (Norco) 5-325 MG per tab 2 tablet  2 tablet Oral Q4H PRN       REVIEW OF SYSTEMS:  A 10-point system was reviewed.  Pertinent positives and negatives are noted in HPI.      PHYSICAL EXAMINATION:  VITAL SIGNS: /80 (BP Location: Right arm)   Pulse 57   Temp 98 °F (36.7 °C) (Oral)   Resp 12   Wt (!) 345 lb (156.5 kg)   LMP  (LMP Unknown)   SpO2 99%   BMI 59.22 kg/m²   GENERAL:  Patient is a 35 year old female in no acute distress.  HEENT:  Normocephalic, atraumatic  ABD: Soft, non tender  SKIN: Warm, dry, no rashes    NEUROLOGICAL:   Mental status: Oriented to person, place, situation and time   Speech: Fluent, no obvious aphasia or dysarthria  Memory and comprehension: Intact   Cranial Nerves: VFF, PERRL 3mm brisk, EOMI, no nystagmus, facial sensation intact, face symmetric, tongue midline, shoulder shrug equal, remainder CN intact  Motor: No drift, no focal arm or leg weakness   Sensory: Intact to light touch bilaterally  Coordination: FTN intact bilaterally  Gait: Deferred      Imaging/Diagnostics:  CTA BRAIN + CTV BRAIN (CPT=70496)    Result Date: 3/24/2025  CONCLUSION: 1. No acute intracranial hemorrhage or hydrocephalus. If there is clinical concern for acute ischemia/infarction, an MRI of the brain would be recommended for further evaluation. 2. There is moderate to severe narrowing at the junction of the transverse and sigmoid sinuses bilaterally.  There is a partially empty sella.  These imaging findings could be seen in the setting of idiopathic intracranial hypertension. 3. No high-grade stenosis, occlusion, or aneurysm is identified within the major intracranial arterial vasculature.    Dictated by (CST): Stromberg, LeRoy, MD on 3/24/2025 at 3:04 PM     Finalized by (CST): Stromberg, LeRoy, MD on 3/24/2025 at 3:15 PM       CT BRAIN OR HEAD (CPT=70450)    Result Date:  3/23/2025  CONCLUSION:  No acute intracranial abnormality.    LOCATION:  [id number]   Dictated by (CST): Salomon Garibay MD on 3/23/2025 at 12:18 PM     Finalized by (CST): Salomon Garibay MD on 3/23/2025 at 12:23 PM          Labs:  Recent Labs   Lab 03/23/25  1047   RBC 4.24   HGB 12.8   HCT 38.2   MCV 90.1   MCH 30.2   MCHC 33.5   RDW 12.7   NEPRELIM 6.46   WBC 11.0   .0         Recent Labs   Lab 03/23/25  1047 03/24/25  0627   * 94   BUN 11 12   CREATSERUM 0.91 0.92   EGFRCR 85 84   CA 10.0 8.9    140   K 3.9 3.7    105   CO2 28.0 24.0       Pre-morbid mRS 0      Assessment and Plan:    A 35 years old female with:    Acute non intractable headache - multifactorial etiology, in a setting of remote hx of migraneous headaches, morbid obesity, and bacterial meningitis.   Migraine cocktails, 3 doses 8 hours apart: Dexamethasone, ketorolac, diphenhydramine, metoclopramide, magnesium sulfate. May extend x 1-2 doses if needed and headache is not tolerable.   Advised adequate hydration, nutrition and sleep  Fall and safety precautions  Viral meningitis - in a setting of likely superimposed migrainous pain.   CT head - no acute findings  LP with CSF studies in ED - elevated WBCs at 165, normal protein and normal glucose. Opening pressure was not measured.    Given the relationship to intense Vasalva at headache onset, pt's morbid obesity and nearly constant pulsatile tinnitus, CTA/CT venogram brain ordered. Revealed no acute intracranial hemorrhage or hydrocephalus. There is moderate to severe narrowing at the junction of the transverse and sigmoid sinuses bilaterally. There is a partially empty sella, these findings could be seen in the setting of idiopathic intracranial hypertension. No high-grade stenosis, occlusion, or aneurysm is identified within the major intracranial arterial vasculature.   Will have patient follow up with neuro surgery as outpatient for further surveillance and treatment if  possible IIH.   ID consult - appreciate recs, meningitis/encephalitis serum panel non reactive.   HX of HTN - controlled, further management per Medicine  Recent renal Bx IgA nephropathy - follows up with nephrology as outpt, renal function normal this admission.   Discussed with pt, all questions answered. Discussed with dr. Ortiz. To follow wit further recommendations if indicated.   Is this a shared or split note between Advanced Practice Provider and Physician? Yes       Princess CRAVEN  [facility]  3/25/2025, 9:17 AM  [place]amos # [id number]

## 2025-03-25 NOTE — PROGRESS NOTES
INFECTIOUS DISEASE  PROGRESS NOTE            [facility]    Thienrenee Collado Patient Status:  Inpatient    1990 MRN [id number]   [facility] 4NW-A Attending Main Roberson MD   Hosp Day # 1 PCP Kofi Giordano MD         Subjective:  : headache improved    Objective:  Temp:  [98 °F (36.7 °C)-98.5 °F (36.9 °C)] 98 °F (36.7 °C)  Pulse:  [50-57] 57  Resp:  [12-18] 12  BP: (100-109)/(50-80) 109/80  SpO2:  [95 %-99 %] 99 %      Vital signs:Temp:  [98 °F (36.7 °C)-98.5 °F (36.9 °C)] 98 °F (36.7 °C)  Pulse:  [50-57] 57  Resp:  [12-18] 12  BP: (100-109)/(50-80) 109/80  SpO2:  [95 %-99 %] 99 %  HEENT: Moist mucous membranes. Extraocular muscles are intact.  Neck: supple no masses  Respiratory: Non labored, symmetric excursion, normal respirations  Cardiovascular: no irregularities in rhythm  Abdomen: Soft, nontender, nondistended.   Musculoskeletal: joints: no swelling   Integument: No lesions. No erythema. No open wounds.  Labs:     COVID-19 Lab Results    COVID-19  Lab Results   Component Value Date    COVID19 Not Detected 2025    COVID19 Not Detected 2024       Pro-Calcitonin  No results for input(s): \"PCT\" in the last 168 hours.    Cardiac  No results for input(s): \"TROP\", \"PBNP\" in the last 168 hours.    Creatinine Kinase  No results for input(s): \"CK\" in the last 168 hours.    Inflammatory Markers  No results for input(s): \"CRP\", \"SCOOTER\", \"LDH\", \"DDIMER\" in the last 168 hours.    Recent Labs   Lab 25  1047   RBC 4.24   HGB 12.8   HCT 38.2   MCV 90.1   MCH 30.2   MCHC 33.5   RDW 12.7   NEPRELIM 6.46   WBC 11.0   .0         Recent Labs   Lab 25  1047 25  0627   * 94   BUN 11 12   CREATSERUM 0.91 0.92   CA 10.0 8.9   ALB 4.4  --     140   K 3.9 3.7    105   CO2 28.0 24.0   ALKPHO 58  --    AST 13  --    ALT 15  --    BILT 0.5  --    TP 7.7  --        No results found for: \"VANCT\"  Microbiology    Hospital Encounter on 25   1.  CSF culture     Status: None (Preliminary result)    Collection Time: 03/23/25  2:18 PM    Specimen: CSF, lumbar puncture; Cerebral spinal fluid   Result Value Ref Range    CSF Culture Result No Growth 1 Day N/A    CSF Smear This is a cytocentrifuged smear. N/A    CSF Smear 4+ WBCs seen N/A    CSF Smear No organisms seen N/A         Problem list reviewed:  Patient Active Problem List   Diagnosis    Acute nonintractable headache, unspecified headache type    Viral meningitis (HCC)    Morbid obesity (HCC)    Acute intractable headache, unspecified headache type             ASSESSMENT/PLAN:  1. Asceptic meningitis  No pathogens detected on PCR panel  Symptoms improved  Supportive care per neurology, hosptialist    2. Transverse and sigmoid narrowing on CTA  Had previous workup in 5/2204, per patient  Per neurology      Constantine Bae MD, MD  [facility]  [phone]

## 2025-03-26 PROCEDURE — 99232 SBSQ HOSP IP/OBS MODERATE 35: CPT | Performed by: HOSPITALIST

## 2025-03-26 PROCEDURE — 99232 SBSQ HOSP IP/OBS MODERATE 35: CPT

## 2025-03-26 RX ORDER — KETOROLAC TROMETHAMINE 15 MG/ML
15 INJECTION, SOLUTION INTRAMUSCULAR; INTRAVENOUS EVERY 8 HOURS
Status: COMPLETED | OUTPATIENT
Start: 2025-03-26 | End: 2025-03-26

## 2025-03-26 RX ORDER — MAGNESIUM SULFATE HEPTAHYDRATE 40 MG/ML
2 INJECTION, SOLUTION INTRAVENOUS EVERY MORNING
Status: DISCONTINUED | OUTPATIENT
Start: 2025-03-27 | End: 2025-03-27

## 2025-03-26 RX ORDER — METOCLOPRAMIDE HYDROCHLORIDE 5 MG/ML
10 INJECTION INTRAMUSCULAR; INTRAVENOUS EVERY 8 HOURS
Status: COMPLETED | OUTPATIENT
Start: 2025-03-26 | End: 2025-03-26

## 2025-03-26 RX ORDER — DIPHENHYDRAMINE HYDROCHLORIDE 50 MG/ML
25 INJECTION, SOLUTION INTRAMUSCULAR; INTRAVENOUS EVERY 8 HOURS
Status: COMPLETED | OUTPATIENT
Start: 2025-03-26 | End: 2025-03-26

## 2025-03-26 RX ORDER — METOCLOPRAMIDE HYDROCHLORIDE 5 MG/ML
10 INJECTION INTRAMUSCULAR; INTRAVENOUS EVERY MORNING
Status: DISCONTINUED | OUTPATIENT
Start: 2025-03-27 | End: 2025-03-27

## 2025-03-26 RX ORDER — DEXAMETHASONE SODIUM PHOSPHATE 4 MG/ML
8 VIAL (ML) INJECTION EVERY MORNING
Status: DISCONTINUED | OUTPATIENT
Start: 2025-03-27 | End: 2025-03-27

## 2025-03-26 RX ORDER — DIPHENHYDRAMINE HYDROCHLORIDE 50 MG/ML
25 INJECTION, SOLUTION INTRAMUSCULAR; INTRAVENOUS EVERY MORNING
Status: DISCONTINUED | OUTPATIENT
Start: 2025-03-27 | End: 2025-03-27

## 2025-03-26 RX ORDER — KETOROLAC TROMETHAMINE 15 MG/ML
15 INJECTION, SOLUTION INTRAMUSCULAR; INTRAVENOUS EVERY MORNING
Status: DISCONTINUED | OUTPATIENT
Start: 2025-03-27 | End: 2025-03-27

## 2025-03-26 RX ORDER — MAGNESIUM SULFATE HEPTAHYDRATE 40 MG/ML
2 INJECTION, SOLUTION INTRAVENOUS EVERY 8 HOURS
Status: COMPLETED | OUTPATIENT
Start: 2025-03-26 | End: 2025-03-26

## 2025-03-26 RX ORDER — DEXAMETHASONE SODIUM PHOSPHATE 4 MG/ML
8 VIAL (ML) INJECTION ONCE
Status: COMPLETED | OUTPATIENT
Start: 2025-03-26 | End: 2025-03-26

## 2025-03-26 NOTE — PROGRESS NOTES
[facility]   part of [facility]     Hospitalist Progress Note     Denton Collado Patient Status:  Observation    1990 MRN [id number]   Location [facility] 4NW-A Attending Main Roberson MD   Hosp Day # 2 PCP Kofi Giordano MD     Subjective:   Feels little better     Objective:    Review of Systems:   A comprehensive review of systems was completed; pertinent positive and negatives stated in subjective.  Vital signs:  Temp:  [98 °F (36.7 °C)-98.4 °F (36.9 °C)] 98 °F (36.7 °C)  Pulse:  [44-73] 44  Resp:  [12-19] 18  BP: (121-124)/(54-60) 121/59  SpO2:  [97 %-99 %] 98 %  Physical Exam:    General: No acute distress    Respiratory: No wheezes, no rhonchi  Cardiovascular: S1, S2, RRR  Abdomen: Soft, NT/ND, +BS  Extremities: no edema    Diagnostic Data:    Labs:  Recent Labs   Lab 25  1047   WBC 11.0   HGB 12.8   MCV 90.1   .0     Recent Labs   Lab 25  1047 25  0627   * 94   BUN 11 12   CREATSERUM 0.91 0.92   CA 10.0 8.9   ALB 4.4  --     140   K 3.9 3.7    105   CO2 28.0 24.0   ALKPHO 58  --    AST 13  --    ALT 15  --    BILT 0.5  --    TP 7.7  --      Estimated Creatinine Clearance: 74.4 mL/min (based on SCr of 0.92 mg/dL).  No results for input(s): \"PTP\", \"INR\" in the last 168 hours.     Microbiology  Hospital Encounter on 25   1. CSF culture     Status: None (Preliminary result)    Collection Time: 25  2:18 PM    Specimen: CSF, lumbar puncture; Cerebral spinal fluid   Result Value Ref Range    CSF Culture Result No Growth 2 Days N/A    CSF Smear This is a cytocentrifuged smear. N/A    CSF Smear 4+ WBCs seen N/A    CSF Smear No organisms seen N/A     Imaging: Reviewed in Epic.  Medications:    labetalol  100 mg Oral 2x Daily(Beta Blocker)    losartan  100 mg Oral Daily       Assessment & Plan:    #Intractable headache- suspect viral meningitis- Intractable and occurred days prior to LP yesterday   -suspect enterovirus- PCR neg per  ID- hold on antivirals  -supportive care   -cont analgesics  -Neuro and ID consulted  -CTA/CTV- could suggest idiopathic intracranial hypertension- Neuro recommended outpt f/u with Neuro-Surgery     #HTN  #PCOS  #Morbid Obesity- BMI 59   #s/p recent renal bx IgA Nephropathy- f/u with outpt nephrologist     DC planning once pain better controlled  Appreciate Neuro input   Cocktail- toradol, decadron, benadryl, reglan, norco- much better this AM          Supplementary Documentation:   Quality:  DVT Mechanical Prophylaxis:   SCDs,    DVT Pharmacologic Prophylaxis   Medication   None                Code Status: Not on file  Hess: No urinary catheter in place  Hess Duration (in days):   Central line:    RADNI:   At this point Ms. Collado is expected to be discharge to: home     The 21st Century Cures Act makes medical notes like these available to patients in the interest of transparency. Please be advised this is a medical document. Medical documents are intended to carry relevant information, facts as evident, and the clinical opinion of the practitioner. The medical note is intended as peer to peer communication and may appear blunt or direct. It is written in medical language and may contain abbreviations or verbiage that are unfamiliar.

## 2025-03-26 NOTE — PROGRESS NOTES
[facility]  [facility] Neurology Progress Note    Denton Collado Patient Status:  Inpatient    1990 MRN [id number]   Location [facility] 4NW-A Attending Main Roberson MD   Hosp Day # 2 PCP Kofi Giordano MD     CC: Viral meningitis, headache    Subjective:  Patient seen for a follow up visit today. Siting in bed, family at bedside. Currently, no headache, doing well. Had strong headache last night returning, received Norco overnight. There was no numbness/tingling to face and forehead this time. Doing better this morning. No nausea, no chest pain, no new focal weakness or paresthesia.     MEDICATIONS:  No current outpatient medications on file.     Current Facility-Administered Medications   Medication Dose Route Frequency    diphenhydrAMINE (Benadryl) 50 mg/mL  injection 25 mg  25 mg Intravenous Q8H    ketorolac (Toradol) 15 MG/ML injection 15 mg  15 mg Intravenous Q8H    magnesium sulfate in sterile water for injection 2 g/50mL IVPB premix 2 g  2 g Intravenous Q8H    metoclopramide (Reglan) 5 mg/mL injection 10 mg  10 mg Intravenous Q8H    dexamethasone (Decadron) 4 MG/ML injection 8 mg  8 mg Intravenous Once    labetalol (Normodyne) tab 100 mg  100 mg Oral 2x Daily(Beta Blocker)    losartan (Cozaar) tab 100 mg  100 mg Oral Daily    sodium chloride 0.9% infusion   Intravenous Continuous    polyethylene glycol (PEG 3350) (Miralax) 17 g oral packet 17 g  17 g Oral Daily PRN    sennosides (Senokot) tab 17.2 mg  17.2 mg Oral Nightly PRN    bisacodyl (Dulcolax) 10 MG rectal suppository 10 mg  10 mg Rectal Daily PRN    fleet enema (Fleet) rectal enema 133 mL  1 enema Rectal Once PRN    ondansetron (Zofran) 4 MG/2ML injection 4 mg  4 mg Intravenous Q6H PRN    acetaminophen (Tylenol) tab 650 mg  650 mg Oral Q4H PRN    Or    HYDROcodone-acetaminophen (Norco) 5-325 MG per tab 1 tablet  1 tablet Oral Q4H PRN    Or    HYDROcodone-acetaminophen (Norco) 5-325 MG per tab 2 tablet  2 tablet Oral Q4H PRN        REVIEW OF SYSTEMS:  A 10-point system was reviewed.  Pertinent positives and negatives are noted in HPI.      PHYSICAL EXAMINATION:  VITAL SIGNS: /59 (BP Location: Right arm)   Pulse (!) 44   Temp 98 °F (36.7 °C) (Oral)   Resp 18   Wt (!) 345 lb (156.5 kg)   LMP  (LMP Unknown)   SpO2 98%   BMI 59.22 kg/m²   GENERAL:  Patient is a 35 year old female in no acute distress.  HEENT:  Normocephalic, atraumatic  ABD: Soft, non tender  SKIN: Warm, dry, no rashes    NEUROLOGICAL:   Mental status: Oriented to person, place, and time   Speech: Fluent, no dysarthria  Memory and comprehension: Intact   Cranial Nerves: VFF, PERRL 3mm brisk, EOMI, no nystagmus, facial sensation intact, face symmetric, tongue midline, shoulder shrug equal, remainder CN intact  Motor: No drift, no focal arm or leg weakness   Sensory: Intact to light touch  Coordination: FTN intact  Gait: Deferred      Imaging/Diagnostics:  CTA BRAIN + CTV BRAIN (CPT=70496)    Result Date: 3/24/2025  CONCLUSION: 1. No acute intracranial hemorrhage or hydrocephalus. If there is clinical concern for acute ischemia/infarction, an MRI of the brain would be recommended for further evaluation. 2. There is moderate to severe narrowing at the junction of the transverse and sigmoid sinuses bilaterally.  There is a partially empty sella.  These imaging findings could be seen in the setting of idiopathic intracranial hypertension. 3. No high-grade stenosis, occlusion, or aneurysm is identified within the major intracranial arterial vasculature.    Dictated by (CST): Stromberg, LeRoy, MD on 3/24/2025 at 3:04 PM     Finalized by (CST): Stromberg, LeRoy, MD on 3/24/2025 at 3:15 PM       CT BRAIN OR HEAD (CPT=70450)    Result Date: 3/23/2025  CONCLUSION:  No acute intracranial abnormality.    LOCATION:  [id number]   Dictated by (CST): Salomon Garibay MD on 3/23/2025 at 12:18 PM     Finalized by (CST): Salomon Garibay MD on 3/23/2025 at 12:23 PM          Labs:  Recent  Labs   Lab 03/23/25  1047   RBC 4.24   HGB 12.8   HCT 38.2   MCV 90.1   MCH 30.2   MCHC 33.5   RDW 12.7   NEPRELIM 6.46   WBC 11.0   .0         Recent Labs   Lab 03/23/25  1047 03/24/25  0627   * 94   BUN 11 12   CREATSERUM 0.91 0.92   EGFRCR 85 84   CA 10.0 8.9    140   K 3.9 3.7    105   CO2 28.0 24.0        Assessment and Plan:     A 35 years old female with:     Acute non intractable headache - multifactorial etiology, in a setting of remote hx of migraneous headaches, morbid obesity, and bacterial meningitis.   3/25 Migraine cocktails x 3 doses 8 hours apart: Dexamethasone, ketorolac, diphenhydramine, metoclopramide, magnesium sulfate.   Given headache returning and strong last night,  2 more doses ordered to be given today, then continue with once daily dosing x 2 days in attempt to reduce usage of Norco. Meningitis headache can linger, thus Norco can be used, but at the least possible dosing.   Advised adequate hydration, nutrition and sleep  Fall and safety precautions  Viral meningitis - in a setting of likely superimposed migrainous pain.   CT head - no acute findings  LP with CSF studies in ED - elevated WBCs at 165, normal protein and normal glucose. Opening pressure was not measured.    Given the relationship to intense Vasalva at headache onset, pt's morbid obesity and nearly constant pulsatile tinnitus, CTA/CT venogram brain ordered for further investigations. Revealed no acute intracranial hemorrhage or hydrocephalus. Moderate to severe narrowing at the junction of the transverse and sigmoid sinuses bilaterally, a partially empty sella noted. Findings could be seen in the setting of idiopathic intracranial hypertension. No high-grade stenosis, occlusion, or aneurysm is identified within the major intracranial arterial vasculature.   Will have patient follow up with neuro surgery as outpatient for further surveillance and treatment if possible IIH. Discussed with PANKAJ/BERNADETTE  team.   ID consult - appreciate recs, meningitis/encephalitis serum panel non reactive.   HX of HTN - controlled, further management per Medicine  Recent renal Bx IgA nephropathy - follows up with nephrology as outpt, renal function normal this admission.   Discussed with pt, all questions answered. Discussed with dr. Ortiz. To follow wit further recommendations if indicated.     Is this a shared or split note between Advanced Practice Provider and Physician? Yes       Princess CRAVEN  [facility]  3/26/2025, 9:26 AM  Ramos # [id number]

## 2025-03-26 NOTE — PROGRESS NOTES
INFECTIOUS DISEASE  PROGRESS NOTE            [facility]    Christinbethany CHARLES Tobias Patient Status:  Inpatient    1990 MRN [id number]   [facility] 4NW-A Attending Main Roberson MD   Hosp Day # 2 PCP Kofi Giordano MD         Subjective:  Feeling better    Objective:  Temp:  [98 °F (36.7 °C)-98.4 °F (36.9 °C)] 98 °F (36.7 °C)  Pulse:  [44-73] 44  Resp:  [12-19] 18  BP: (121-124)/(54-60) 121/59  SpO2:  [97 %-99 %] 98 %      Vital signs:Temp:  [98 °F (36.7 °C)-98.4 °F (36.9 °C)] 98 °F (36.7 °C)  Pulse:  [44-73] 44  Resp:  [12-19] 18  BP: (121-124)/(54-60) 121/59  SpO2:  [97 %-99 %] 98 %  HEENT: Moist mucous membranes. Extraocular muscles are intact.  Neck: supple no masses  Respiratory: Non labored, symmetric excursion, normal respirations  Cardiovascular: no irregularities in rhythm  Abdomen: Soft, nontender, nondistended.   Musculoskeletal: joints: no swelling   Integument: No lesions. No erythema. No open wounds.  Labs:     COVID-19 Lab Results    COVID-19  Lab Results   Component Value Date    COVID19 Not Detected 2025    COVID19 Not Detected 2024       Pro-Calcitonin  No results for input(s): \"PCT\" in the last 168 hours.    Cardiac  No results for input(s): \"TROP\", \"PBNP\" in the last 168 hours.    Creatinine Kinase  No results for input(s): \"CK\" in the last 168 hours.    Inflammatory Markers  No results for input(s): \"CRP\", \"SCOOTER\", \"LDH\", \"DDIMER\" in the last 168 hours.    Recent Labs   Lab 25  1047   RBC 4.24   HGB 12.8   HCT 38.2   MCV 90.1   MCH 30.2   MCHC 33.5   RDW 12.7   NEPRELIM 6.46   WBC 11.0   .0         Recent Labs   Lab 25  1047 25  0627   * 94   BUN 11 12   CREATSERUM 0.91 0.92   CA 10.0 8.9   ALB 4.4  --     140   K 3.9 3.7    105   CO2 28.0 24.0   ALKPHO 58  --    AST 13  --    ALT 15  --    BILT 0.5  --    TP 7.7  --        No results found for: \"VANCT\"  Microbiology    Hospital Encounter on 25   1. CSF  culture     Status: None (Preliminary result)    Collection Time: 03/23/25  2:18 PM    Specimen: CSF, lumbar puncture; Cerebral spinal fluid   Result Value Ref Range    CSF Culture Result No Growth 2 Days N/A    CSF Smear This is a cytocentrifuged smear. N/A    CSF Smear 4+ WBCs seen N/A    CSF Smear No organisms seen N/A         Problem list reviewed:  Patient Active Problem List   Diagnosis    Acute nonintractable headache, unspecified headache type    Viral meningitis (HCC)    Morbid obesity (HCC)    Acute intractable headache, unspecified headache type             ASSESSMENT/PLAN:  1. Asceptic meningitis  No pathogens detected on PCR panel  Symptoms improved    2. Transverse and sigmoid narrowing on CTA  Had previous workup in 5/2204, per patient  Per neurology    DC planning per hospitalist and neurology  Please call back ID if any new issues          Constantine Bae MD, MD  [facility]  [phone]

## 2025-03-26 NOTE — PLAN OF CARE
Pt received A&Ox4. VSS. Afebrile. All meds per MAR. IVF infusing @100mL/h. Up ambulating in room. Voiding. No migraines reported today. Tolerating diet. Pt and family updated on POC. Call light within reach.

## 2025-03-26 NOTE — PAYOR COMM NOTE
--------------  ADMISSION REVIEW     Payor: [employer]A [employer]AMB  Subscriber #:  [id number]  Authorization Number: [id number]    Admit date: 3/24/25  Admit time:  4:14 PM       REVIEW DOCUMENTATION:  ED Provider Notes signed by Joe Agee MD at 3/24/2025  7:08 AM       Author: Joe Agee MD Service: -- Author Type: Physician    Filed: 3/24/2025  7:08 AM Date of Service: 3/23/2025 11:14 AM Status: Signed     Patient Seen in: [facility] In [place]    History     Chief Complaint   Patient presents with    Headache     Stated Complaint: HA    HPI  Patient presents to the Emergency Department with a 48-hour history of ongoing headache.  The patient states that 2 days ago, she felt a rather sudden onset of headache along with nausea and vomiting.  She went to a local immediate care where she was treated with pain medication, but her symptoms continue to persist.  She had no imaging performed at that time.  She has had no history of acute trauma, fever or chills.  Patient has no change in cognition, or focal motor or sensory deficits.    Physical Exam     ED Triage Vitals [03/23/25 1039]   /73   Pulse 79   Resp 20   Temp 98.2 °F (36.8 °C)   Temp src Oral   SpO2 98 %   O2 Device None (Room air)     Vital Signs  BP: 133/62  Pulse: 63  Resp: 14  Temp: 98.4 °F (36.9 °C)  Temp src: Oral  MAP (mmHg): 78    Oxygen Therapy  SpO2: 96 %  O2 Device: None (Room air)  Pulse Oximetry Type: Continuous  Oximetry Probe Site Changed: No  Pulse Ox Probe Location: Left hand    Physical Exam  Vitals and nursing note reviewed.   Constitutional:       Appearance: She is well-developed.   HENT:      Head: Normocephalic.   Cardiovascular:      Rate and Rhythm: Normal rate and regular rhythm.      Heart sounds: Normal heart sounds. No murmur heard.  Pulmonary:      Effort: Pulmonary effort is normal. No respiratory distress.      Breath sounds: Normal breath sounds.   Abdominal:      General: Bowel sounds are  normal.      Palpations: Abdomen is soft.      Tenderness: There is no abdominal tenderness. There is no rebound.   Musculoskeletal:         General: No tenderness. Normal range of motion.      Cervical back: Normal range of motion and neck supple.   Lymphadenopathy:      Cervical: No cervical adenopathy.   Skin:     General: Skin is warm and dry.      Findings: No rash.   Neurological:      Mental Status: She is alert and oriented to person, place, and time.      Sensory: No sensory deficit.      Comments: Patient has normal speech and cognition.  No cerebellar signs are noted.  No focal or lateralizing motor or sensory deficits are noted.     ED Course     Labs Reviewed   COMP METABOLIC PANEL (14) - Abnormal; Notable for the following components:       Result Value    Glucose 102 (*)     All other components within normal limits   CELL COUNT, CSF - Abnormal; Notable for the following components:    TNC,  (*)     RBC CSF 34 (*)     All other components within normal limits   GLUCOSE, CEREBROSPINAL FLUID - Normal   PROTEIN, TOTAL, CSF - Normal   POCT PREGNANCY URINE - Normal   RESPIRATORY FLU EXPAND PANEL + COVID-19 - Normal   CBC WITH DIFFERENTIAL WITH PLATELET   CSF RBC TUBE 1   CELLCOUNT/DIFF CSF   PATH COMMENT CSF   MENINGITIS ENCEPHALITIS PANEL BY PCR   BASIC METABOLIC PANEL (8)   CSF CULTURE     ED Course as of 03/24/25 0706  ------------------------------------------------------------  Time: 03/23 1123  Value: Comp Metabolic Panel (14)(!)  Comment: Unremarkable    ------------------------------------------------------------  Time: 03/23 1123  Value: CBC With Differential With Platelet  Comment: Unremarkable    ------------------------------------------------------------  Time: 03/23 1212  Value: POCT urine pregnancy: Negative  Comment: (Reviewed)  ------------------------------------------------------------  Time: 03/23 1254  Value: CT BRAIN OR HEAD (CPT=70450)  Comment: Images were independently  viewed by me and no evidence of hemorrhage was noted.  No other acute intracranial abnormality noted.  ------------------------------------------------------------  Time: 03/23 1415  Comment: Because of the abruptness of the patient's headache that occurred 2 days ago, I felt that evaluation for subarachnoid hemorrhage was indicated.  Spinal tap was performed as a result of this.    The patient requires an LP to rule out subarachnoid hemorrhage.  Informed consent was obtained.  The standard timeout procedure was completed.  The patient was prepped and draped in sterile fashion.  Landmarks were identified.  Local anesthesia was achieved with 1% Lidocaine.  A 3.5 inch 22 gauge spinal needed was inserted into a lumbar interspinous space and spinal fluid was drained.  Approximately 4 ml's of CSF were obtained and sent to the lab.  The fluid appeared clear.  The needle was withdrawn.  There were no complications related to the procedure and the patient tolerated the procedure well.      ------------------------------------------------------------  Time: 03/23 1505  Value: Glucose CSF: 51  Comment: (Reviewed)  ------------------------------------------------------------  Time: 03/23 1505  Value: Total Protein CSF: 40.1  Comment: (Reviewed)  ------------------------------------------------------------  Time: 03/23 1530  Value: Cell Count/Diff CSF  Comment: Patient is noted to have a CSF white count of 165 with neutrophils of 76 and lymphocytes of 16%.     Patient continued to have headache, requiring repeated doses of pain medication.  Given the patient's CSF results, the patient was hospitalized    MDM      Patient comes to the Emergency Department with acute headache which has been persistent over the past 2 days.  Differential diagnosis includes intracranial hemorrhage, meningitis, migraine headache.  Patient initially had improvement of headache with Reglan and Benadryl.  CT scan did not show evidence of intracranial  hemorrhage.  However, because of the abruptness of the headache that patient was experiencing, an LP was performed to evaluate for possible subarachnoid hemorrhage.  This did not reveal a significant mount of blood, but was noted to have a small amount of leukocytes.  Patient continued to require pain medication as her headache returned.  Because of this and the spinal tap results, the patient was hospitalized for acute management of her headache and presumed viral meningitis.    Admission disposition: 3/23/2025  4:35 PM    Medical Decision Making  Disposition and Plan     Clinical Impression:  1. Acute intractable headache, unspecified headache type    2. Viral meningitis (HCC)       Disposition:  Admit  3/23/2025  4:35 pm    Joe Agee MD on 3/24/2025  7:08 AM      History and Physical   Chief Complaint: Headache  History of Present Illness:   35 year old female with medical history of hypertension and PCOS who presents to the ER with complaints of a headache for the last several days.  She reports one day she felt like she had chills but never had a fever, chest pain, shortness of breath or vision changes.  She reporst she hasn't had a migraine in a long while.  Currently her headache is much better and she has some mild nausea. She denies abdominal pain, diarrhea, recent travel or sick contacts.      /50  Pulse 50  Temp 98.4 °F (36.9 °C) (Oral)  Resp 16   SpO2 96%       Lab 03/23/25  1047   RBC 4.24   HGB 12.8   HCT 38.2   MCV 90.1   MCH 30.2   MCHC 33.5   RDW 12.7   NEPRELIM 6.46   WBC 11.0   .0      *   BUN 11   CREATSERUM 0.91   EGFRCR 85   CA 10.0   ALB 4.4      K 3.9      CO2 28.0   ALKPHO 58   AST 13   ALT 15   BILT 0.5   TP 7.7       Assessment & Plan:  #Intractable headache  -Migraine HA  -add fiorecet  -s/p LP  -Cx pending  -CT head negative for acute changes  -pain control  -antiemetics     #hypertension  -controlled  -resume losartan, labetalol     #Morbid Obesity                 -BMI 59.22  Sarai Jerez MD  3/23/2025  5:13 PM       3/24/2025  Hospitalist Progress Note   Still with bad HA     Temp:  [98.2 °F (36.8 °C)-98.5 °F (36.9 °C)] 98.4 °F (36.9 °C)  Pulse:  [50-79] 63  Resp:  [14-22] 14  BP: (104-136)/(50-79) 133/62  SpO2:  [96 %-98 %] 96 %    Lab 03/23/25  1047 03/24/25  0627   * 94   BUN 11 12   CREATSERUM 0.91 0.92   CA 10.0 8.9   ALB 4.4  --     140   K 3.9 3.7    105   CO2 28.0 24.0   ALKPHO 58  --    AST 13  --    ALT 15  --    BILT 0.5  --    TP 7.7  --      Assessment & Plan:  #Intractable headache- possible Migraine. Intractable and occurred days prior to LP yesterday   -add fiorecet  -Cx NGTD   -CT head negative for acute changes  -pain control  -antiemetics  -Neuro consult    -empiric meds per ID for viral meningitis      #HTN  #PCOS  #Morbid Obesity- BMI 59   #s/p recent renal bx- f/u with outpt nephrologist      DVT Mechanical Prophylaxis:   Main Danielle MD   3/24/2025  8:23 AM     Neurology Consult Note   REASON FOR EVALUATION:  Headache     HISTORY OF PRESENT ILLNESS:  35-year-old woman with morbid obesity and polycystic ovarian syndrome who came to the hospital due to intractable headache.  This started on Friday.  She was feeling nauseated after eating her lunch \"that did not sit well\" and, while she was vomiting, she had the sudden onset of very severe head pain.  She felt it in her neck and behind her eyes.  She does not think it was pulsatile nor did it make her sensitive to light or sound.  This was somewhat familiar to migrainous pain she has had in the past but this was much more severe than she has ever experienced before and she has not had a migrainous headache in over 10 years.  She sought emergency care over the weekend but her headache persisted in spite of typical care and, particularly as she started to develop neck pain, she was admitted for further workup including lumbar puncture for CSF analysis. Of note,  she has pulsatile tinnitus on a constant basis for years, though this has not been worse of late.    /62 (BP Location: Right arm)  Pulse 63  Temp 98.4 °F (36.9 °C) (Oral)  Resp 14  Wt (!) 345 lb (156.5 kg)  SpO2 96%  BMI 59.22 kg/m²     ASSESSMENT:  35-year-old woman with probable viral meningitis with superimposed migrainous pain.     PLAN:  Principal Problem:    Acute nonintractable headache, unspecified headache type  Active Problems:    Viral meningitis (HCC)    Morbid obesity (HCC)  I recommend empiric antimicrobials, particularly antivirals, while we await CSF serologies, though defer to infectious disease consultant already on the case.    Migraine cocktails, 3 doses 8 hours apart: Dexamethasone, ketorolac, diphenhydramine, metoclopramide, magnesium sulfate.    Given the relationship to intense Valsalva, her morbid obesity and nearly constant pulsatile tinnitus I think vessel imaging will be of use to her, such that I will order a routine CTA/V of the brain.    We are following.     Jalen Ortiz MD  3/24/2025 11:45 AM     INFECTIOUS DISEASE CONSULTATION   Reason for Consultation:  Suspected viral meningitis     History of Present Illness:  35 year old female reports onset of headache on \"Friday\" 3/21, and had nausea and vomited.  She has had a persistent headache and came to ED on 3/23.   She underwent an LP that was consistent with viral meningitis.  She reports working at a [employer], no recent travel.  She lives at home with  and 15 year old.  No one else is known to have been sick.      Temp:  [98.2 °F (36.8 °C)-98.5 °F (36.9 °C)] 98.4 °F (36.9 °C)  Pulse:  [50-79] 63  Resp:  [14-22] 14  BP: (104-136)/(50-79) 133/62  SpO2:  [96 %-98 %] 96 %    ASSESSMENT/PLAN:  1. Viral meningitis without signs of encephalitis     Symptoms of non resolving headache over the past 3 days, along with vomiting and diarrhea at the onset  -suspicious for Enterovirus  No genital lesions  -CSF showing 165 WBC,  mixed neutrophils and lymphocytes, normal glucose and CSF/serum glucose ratio  PCR panel pending      Continue supportive care     2. IGA nephropathy, recently diagnosed on renal biopsy  Following with nephrology     3. Obesity     4. HTN    Constantine Bae MD  3/24/2025 12:03 PM     3/25/2025  Neurology Progress Note   headache has improved. Rated at 3-4/10, last migraine cocktail was given recently. Denies any nausea, light or noise sensitivity at this time, no new focal numbness/tingling or weakness.     /80 (BP Location: Right arm)  Pulse 57  Temp 98 °F (36.7 °C) (Oral)  Resp 12  SpO2 99%  BMI 59.22 kg/m²     Assessment and Plan:  Acute non intractable headache - multifactorial etiology, in a setting of remote hx of migraneous headaches, morbid obesity, and bacterial meningitis.   Migraine cocktails, 3 doses 8 hours apart: Dexamethasone, ketorolac, diphenhydramine, metoclopramide, magnesium sulfate. May extend x 1-2 doses if needed and headache is not tolerable.   Advised adequate hydration, nutrition and sleep  Fall and safety precautions  Viral meningitis - in a setting of likely superimposed migrainous pain.   CT head - no acute findings  LP with CSF studies in ED - elevated WBCs at 165, normal protein and normal glucose. Opening pressure was not measured.    Given the relationship to intense Vasalva at headache onset, pt's morbid obesity and nearly constant pulsatile tinnitus, CTA/CT venogram brain ordered. Revealed no acute intracranial hemorrhage or hydrocephalus. There is moderate to severe narrowing at the junction of the transverse and sigmoid sinuses bilaterally. There is a partially empty sella, these findings could be seen in the setting of idiopathic intracranial hypertension. No high-grade stenosis, occlusion, or aneurysm is identified within the major intracranial arterial vasculature.   Will have patient follow up with neuro surgery as outpatient for further surveillance and  treatment if possible IIH.   ID consult - appreciate recs, meningitis/encephalitis serum panel non reactive.   HX of HTN - controlled, further management per Medicine  Recent renal Bx IgA nephropathy - follows up with nephrology as outpt, renal function normal this admission.     Jalen Ortiz MD  3/25/2025  8:49 PM     INFECTIOUS DISEASE PROGRESS NOTE   headache improved    Temp:  [98 °F (36.7 °C)-98.5 °F (36.9 °C)] 98 °F (36.7 °C)  Pulse:  [50-57] 57  Resp:  [12-18] 12  BP: (100-109)/(50-80) 109/80  SpO2:  [95 %-99 %] 99 %    ASSESSMENT/PLAN:  1. Asceptic meningitis  No pathogens detected on PCR panel  Symptoms improved  Supportive care per neurology, hosptialist     2. Transverse and sigmoid narrowing on CTA  Had previous workup in 5/2204, per patient  Per neurology     Constantine Bae MD  3/25/2025  9:49 AM     3/26/2025  INFECTIOUS DISEASE PROGRESS NOTE   Feeling better    Temp:  [98 °F (36.7 °C)-98.4 °F (36.9 °C)] 98 °F (36.7 °C)  Pulse:  [44-73] 44  Resp:  [12-19] 18  BP: (121-124)/(54-60) 121/59  SpO2:  [97 %-99 %] 98 %    ASSESSMENT/PLAN:  1. Asceptic meningitis  No pathogens detected on PCR panel  Symptoms improved     2. Transverse and sigmoid narrowing on CTA  Had previous workup in 5/2204, per patient  Per neurology     DC planning per hospitalist and neurology  Please call back ID if any new issues     Constantine Bae MD  3/26/2025  8:40 AM     MEDICATIONS ADMINISTERED:  Medications 03/23/25 03/24/25 03/25/25 03/26/25   dexamethasone (Decadron) 4 MG/ML injection 8 mg  Dose: 8 mg  Freq: Once Route: IV  Start: 03/26/25 0930 End: 03/26/25 0936       0936 SS-Given         diphenhydrAMINE (Benadryl) 50 mg/mL injection 25 mg  Dose: 25 mg  Freq: Every 8 hours Route: IV  Start: 03/26/25 0930 End: 03/26/25 2159 0936 SS-Given   1400     8569-D/C'd       diphenhydrAMINE (Benadryl) 50 mg/mL injection 25 mg  Dose: 25 mg  Freq: Every 8 hours Route: IV  Start: 03/24/25 1145 End: 03/25/25 0885 6712  MB-Given   2122 GS-Given      0844 GS-Given          diphenhydrAMINE (Benadryl) 50 mg/mL injection 25 mg  Dose: 25 mg  Freq: Once Route: IV  Start: 03/23/25 1108 End: 03/23/25 1116    1116 LM-Given            HYDROmorphone (Dilaudid) 1 MG/ML injection 0.5 mg  Dose: 0.5 mg  Freq: Once Route: IV  Start: 03/23/25 1830 End: 03/23/25 1830    1830 CB-Given            ketorolac (Toradol) 15 MG/ML injection 15 mg  Dose: 15 mg  Freq: Every 8 hours Route: IV  Start: 03/26/25 0930 End: 03/26/25 2159 0936 SS-Given   1400     2159-D/C'd       ketorolac (Toradol) 15 MG/ML injection 15 mg  Dose: 15 mg  Freq: Every 8 hours Route: IV  Start: 03/24/25 1145 End: 03/25/25 0845     1239 MB-Given   2124 GS-Given      0845 GS-Given          labetalol (Normodyne) tab 100 mg  Dose: 100 mg  Freq: 2 times daily(Beta Blocker) Route: OR  Start: 03/23/25 2245    (2245 CP)-Not Given       0636 CP-Given   1721 MB-Given      (0600 GS)-Not Given   1843 SS-Given      0602 EC-Given   1800        losartan (Cozaar) tab 100 mg  Dose: 100 mg  Freq: Daily Route: OR  Start: 03/24/25 0930 0930 MB-Given       (0930 SS)-Not Given       0936 SS-Given         magnesium sulfate in sterile water for injection 2 g/50mL IVPB premix 2 g  Dose: 2 g  Freq: Every 8 hours Route: IV  Last Dose: 2 g (03/26/25 0936)  Start: 03/26/25 0930 End: 03/26/25 2159 0936 SS-New Bag   1400     2159-D/C'd       magnesium sulfate in sterile water for injection 2 g/50mL IVPB premix 2 g  Dose: 2 g  Freq: Every 8 hours Route: IV  Last Dose: 2 g (03/25/25 0842)  Start: 03/24/25 1145 End: 03/25/25 0942     1239 MB-New Bag   2127 GS-New Bag      0842 GS-New Bag          metoclopramide (Reglan) 5 mg/mL injection 10 mg  Dose: 10 mg  Freq: Every 8 hours Route: IV  Start: 03/26/25 0930 End: 03/26/25 2159       0936 SS-Given   1400     2159-D/C'd       metoclopramide (Reglan) 5 mg/mL injection 10 mg  Dose: 10 mg  Freq: Every 8 hours Route: IV  Start: 03/24/25 1145 End: 03/25/25  0848     1239 MB-Given   2128 GS-Given      0848 GS-Given          metoclopramide (Reglan) 5 mg/mL injection 10 mg  Dose: 10 mg  Freq: Once Route: IV  Start: 03/23/25 1108 End: 03/23/25 1116    1116 LM-Given            sodium chloride 0.9% infusion  Freq: Once Route: IV  Last Dose: Stopped (03/23/25 1942)  Start: 03/23/25 1627 End: 03/23/25 1942    1630 LM-New Bag   1642 LM-Handoff     1942 TM-Stopped              sodium chloride 0.9% infusion  Rate: 100 mL/hr  Freq: Continuous Route: IV  Start: 03/23/25 2030 2127 CP-New Bag        0753 SS-New Bag   2030 EC-New Bag         sodium chloride 0.9% infusion  Rate: 125 mL/hr  Freq: Continuous Route: IV  Last Dose: Stopped (03/23/25 2030)  Start: 03/23/25 2030 End: 03/23/25 2229   Admin Instructions:   ED holding order only  Cancel if other admission orders exist!    2030 CP-Stopped   2229-D/C'd           Or   HYDROcodone-acetaminophen (Norco) 5-325 MG per tab 2 tablet  Dose: 2 tablet  Freq: Every 4 hours PRN Route: OR  PRN Reason: severe pain  Start: 03/23/25 2026   Admin Instructions:   Use PRN reason as a guide and follow range order policy      1528 SS-Given   2034 EC-Given         butalbital-acetaminophen-caffeine (Fioricet) -40 MG per tab 1 tablet  Dose: 1 tablet  Freq: Every 4 hours PRN Route: OR  PRN Reason: Headaches  Start: 03/23/25 2249 End: 03/24/25 1131     0930 MB-Given   1131-D/C'd        iopamidol 76% (ISOVUE-370) injection for power injector  Dose: 80 mL  Freq: IMG once as needed Route: IV  PRN Reason: contrast  Start: 03/24/25 1429 End: 03/24/25 1430     1430 NC-Given           Or   ketorolac (Toradol) 30 MG/ML injection 30 mg  Dose: 30 mg  Freq: Every 6 hours PRN Route: IV  PRN Reason: severe pain  Start: 03/23/25 2026 End: 03/24/25 1131 2127 CP-Given       1131-D/C'd         ondansetron (Zofran) 4 MG/2ML injection 4 mg  Dose: 4 mg  Freq: Every 6 hours PRN Route: IV  PRN Reasons: Nausea,vomiting  Start: 03/23/25 2026   Admin Instructions:    Default antiemetic sequence (unless otherwise preferred by patient):  1. ondansetron (Zofran)  2. prochlorperazine (Compazine). Wait 15 minutes before proceeding to next medication in sequence.  Follow therapeutic duplication policy.    2127 CP-Given       0934 MB-Given             Vitals      Date/Time Temp Pulse Resp BP SpO2 Weight O2 Device O2 Flow Rate (L/min) [facility]    03/26/25 0414 98 °F (36.7 °C) 44 18 121/59 98 % -- None (Room air) -- LM    03/25/25 2000 98.2 °F (36.8 °C) 73 19 122/60 99 % -- None (Room air) -- LM    03/25/25 1206 98.4 °F (36.9 °C) 54 12 124/54 97 % -- None (Room air) -- KS    03/25/25 0754 98 °F (36.7 °C) 57 12 109/80 99 % -- None (Room air) -- KS    03/25/25 0636 98.5 °F (36.9 °C) -- 18 106/50 96 % -- None (Room air) -- EK     03/24/25 2000 98.4 °F (36.9 °C) 53 16 100/54 96 % -- None (Room air) -- AB   03/24/25 1235 98.3 °F (36.8 °C) 50 16 102/57 95 % -- None (Room air) -- TS   03/24/25 0529 98.4 °F (36.9 °C) 63 14 133/62 96 % -- None (Room air) -- AB   03/23/25 2227 -- -- -- -- -- 345 lb (156.5 kg) Abnormal  -- -- CP   03/23/25 2040 98.5 °F (36.9 °C) 76 22 104/55 98 % -- None (Room air) -- AB   03/23/25 1825 -- 63 15 116/59 98 % -- None (Room air) -- CB   03/23/25 1600 98.4 °F (36.9 °C) 50 16 116/50 96 % -- None (Room air) -- LMA   03/23/25 1416 -- 68 16 116/51 96 % -- None (Room air) -- LMA   03/23/25 1228 -- 53 16 122/79 98 % -- None (Room air) -- LMA   03/23/25 1039 98.2 °F (36.8 °C) 79 20 136/73 98 % -- None (Room air) -- TD       FOR REVIEW/APPROVAL OF INPT ADMISSION

## 2025-03-26 NOTE — PLAN OF CARE
Patient is alert and oriented, room air, voids, VSS, afebrile, complains of headache, norco admin per MAR, IVF infusing, ambulatory in room independently.

## 2025-03-27 VITALS
WEIGHT: 293 LBS | HEART RATE: 41 BPM | SYSTOLIC BLOOD PRESSURE: 122 MMHG | TEMPERATURE: 98 F | OXYGEN SATURATION: 95 % | BODY MASS INDEX: 59 KG/M2 | RESPIRATION RATE: 16 BRPM | DIASTOLIC BLOOD PRESSURE: 66 MMHG

## 2025-03-27 PROCEDURE — 99239 HOSP IP/OBS DSCHRG MGMT >30: CPT | Performed by: HOSPITALIST

## 2025-03-27 PROCEDURE — 99232 SBSQ HOSP IP/OBS MODERATE 35: CPT

## 2025-03-27 NOTE — PROGRESS NOTES
3/27/2025    Patient Name: Denton CHARLES Tobias      To Whom It May Concern:    This letter has been written at the patient's request. The above patient was seen at [facility] for treatment of a medical condition from 3/23/25 - 3/27/25.  Please excuse my patient's absence.    The patient may return to work on 3/31/25 .      Sincerely,        Main Roberson  3/27/2025

## 2025-03-27 NOTE — PROGRESS NOTES
[facility]   part of [facility]     Hospitalist Progress Note     Denton Collado Patient Status:  Observation    1990 MRN [id number]   Location [facility] 4NW-A Attending Main Roberson MD   Hosp Day # 3 PCP Kofi Giordano MD     Subjective:   Feels better wants to go home     Objective:    Review of Systems:   A comprehensive review of systems was completed; pertinent positive and negatives stated in subjective.  Vital signs:  Temp:  [97.8 °F (36.6 °C)-98.1 °F (36.7 °C)] 97.8 °F (36.6 °C)  Pulse:  [41-78] 41  Resp:  [16-20] 16  BP: (122-128)/(63-71) 122/66  SpO2:  [95 %-96 %] 95 %  Physical Exam:    General: No acute distress    Respiratory: No wheezes, no rhonchi  Cardiovascular: S1, S2, RRR  Abdomen: Soft, NT/ND, +BS  Extremities: no edema    Diagnostic Data:    Labs:  Recent Labs   Lab 25  1047   WBC 11.0   HGB 12.8   MCV 90.1   .0     Recent Labs   Lab 25  1047 25  0627   * 94   BUN 11 12   CREATSERUM 0.91 0.92   CA 10.0 8.9   ALB 4.4  --     140   K 3.9 3.7    105   CO2 28.0 24.0   ALKPHO 58  --    AST 13  --    ALT 15  --    BILT 0.5  --    TP 7.7  --      Estimated Creatinine Clearance: 74.4 mL/min (based on SCr of 0.92 mg/dL).  No results for input(s): \"PTP\", \"INR\" in the last 168 hours.     Microbiology  Hospital Encounter on 25   1. CSF culture     Status: None    Collection Time: 25  2:18 PM    Specimen: CSF, lumbar puncture; Cerebral spinal fluid   Result Value Ref Range    CSF Culture Result No Growth 3 Days N/A    CSF Smear This is a cytocentrifuged smear. N/A    CSF Smear 4+ WBCs seen N/A    CSF Smear No organisms seen N/A     Imaging: Reviewed in Epic.  Medications:    dexamethasone  8 mg Intravenous QAM    diphenhydrAMINE  25 mg Intravenous QAM    ketorolac  15 mg Intravenous QAM    metoclopramide  10 mg Intravenous QAM    magnesium sulfate  2 g Intravenous QAM    labetalol  100 mg Oral 2x Daily(Beta Blocker)     losartan  100 mg Oral Daily       Assessment & Plan:    #Intractable headache- suspect viral meningitis- Intractable and occurred days prior to LP yesterday   -suspect enterovirus- PCR neg per ID- hold on antivirals  -supportive care   -cont analgesics  -Neuro and ID consulted  -CTA/CTV- could suggest idiopathic intracranial hypertension- Neuro recommended outpt f/u with Neuro-Surgery     #HTN  #PCOS  #Morbid Obesity- BMI 59   #s/p recent renal bx IgA Nephropathy- f/u with outpt nephrologist     Appreciate Neuro input   Cocktail- toradol, Mg++, decadron, benadryl, reglan, norco much improved     DC home          Supplementary Documentation:   Quality:  DVT Mechanical Prophylaxis:   SCDs,    DVT Pharmacologic Prophylaxis   Medication   None                Code Status: Not on file  Hess: No urinary catheter in place  Hess Duration (in days):   Central line:    RANDI:   At this point Ms. Collado is expected to be discharge to: home     The 21st Century Cures Act makes medical notes like these available to patients in the interest of transparency. Please be advised this is a medical document. Medical documents are intended to carry relevant information, facts as evident, and the clinical opinion of the practitioner. The medical note is intended as peer to peer communication and may appear blunt or direct. It is written in medical language and may contain abbreviations or verbiage that are unfamiliar.

## 2025-03-27 NOTE — PLAN OF CARE
Patient is alert and oriented x 4, room air, abdomen soft, voids, denies pain, IVF infusing, medications per MAR, VSS.

## 2025-03-27 NOTE — DISCHARGE SUMMARY
[facility]     Denton Collado Patient Status:  Inpatient    1990 MRN [id number]   Location [facility] 4NW-A Attending No att. providers found   Hosp Day # 3 PCP Kofi Giordano MD     Date of Admission: 3/23/2025  Date of Discharge: 3/27/2025    Discharge Disposition: Home or Self Care    Admitting Diagnosis:   Viral meningitis (HCC) [A87.9]  Acute nonintractable headache, unspecified headache type [R51.9]    Hospital Discharge Diagnoses:  #Intractable headache- suspect viral meningitis- Intractable and occurred days prior to LP yesterday   -suspect enterovirus- PCR neg per ID- hold on antivirals  -supportive care   -cont analgesics  -Neuro and ID consulted  -CTA/CTV- could suggest idiopathic intracranial hypertension- Neuro recommended outpt f/u with Neuro-Surgery      #HTN  #PCOS  #Morbid Obesity- BMI 59   #s/p recent renal bx IgA Nephropathy- f/u with outpt nephrologist      Appreciate Neuro input   Cocktail- toradol, Mg++, decadron, benadryl, reglan, norco much improved     Lace+ Score: 47  59-90 High Risk  29-58 Medium Risk  0-28   Low Risk.     Brief Synopsis: ID and Neuro consulted. Pt slowly improved throughout the hospital course. Pt dx with aseptic meningitis. Microbial panel unimpressive. ID recommended supportive care and pt improved with cocktail HA meds per Neuro. Pt cleared by consults and DC in good condition.     Procedures during hospitalization:   LP    Lab/Test results pending at Discharge:   None     Consultants:  ID, Neuro     Discharge Medication List:     Discharge Medications        CONTINUE taking these medications        Instructions Prescription details   labetalol 100 MG Tabs  Commonly known as: Normodyne      Take 1 tablet (100 mg total) by mouth daily. Reports taking daily as needed   Refills: 0     losartan 50 MG Tabs  Commonly known as: Cozaar      Take 2 tablets (100 mg total) by mouth daily. Reports taking 1 tablet daily as needed for  hypertension   Refills: 0            STOP taking these medications      acyclovir 800 MG Tabs  Commonly known as: Zovirax        cephALEXin 500 MG Caps  Commonly known as: Keflex        gabapentin 100 MG Caps  Commonly known as: Neurontin        HYDROcodone-acetaminophen 5-325 MG Tabs  Commonly known as: Norco        methylPREDNISolone 4 MG Tbpk  Commonly known as: Medrol Dosepak        predniSONE 50 MG Tabs  Commonly known as: Deltasone        sucralfate 1 g Tabs  Commonly known as: Carafate        sulfamethoxazole-trimethoprim -160 MG Tabs per tablet  Commonly known as: Bactrim DS                 Follow-up appointment:   Kofi Giordano MD  [address]  [facility] [address] [phone]    Follow up      China Reyes, APRN  [address]  [facility] [address] [phone]    Schedule an appointment as soon as possible for a visit in 1 month(s)  follow up in [facility]    Cristofer Mandel MD  [address] [address] [address] [phone]    Schedule an appointment as soon as possible for a visit in 1 month(s)  neurology/headaches/follow up      -----------------------------------------------------------------------------------------------  PATIENT DISCHARGE INSTRUCTIONS: See electronic chart    Main Roberson MD 3/27/2025    Time spent: 33 minutes

## 2025-03-27 NOTE — PROGRESS NOTES
[facility] Neurology Progress Note    Denton Collado Patient Status:  Inpatient    1990 MRN [id number]   Location [facility] 4NW-A Attending Main Roberson MD   Hosp Day # 3 PCP Kofi Giordano MD     CC: Headache    Subjective: Seen for a follow up today. No headache. Feels good. No new neurological deficits, no visual or speech disturbances. Wants to go home.     MEDICATIONS:  No current outpatient medications on file.     Current Facility-Administered Medications   Medication Dose Route Frequency    dexamethasone (Decadron) 4 MG/ML injection 8 mg  8 mg Intravenous QAM    diphenhydrAMINE (Benadryl) 50 mg/mL  injection 25 mg  25 mg Intravenous QAM    ketorolac (Toradol) 15 MG/ML injection 15 mg  15 mg Intravenous QAM    metoclopramide (Reglan) 5 mg/mL injection 10 mg  10 mg Intravenous QAM    magnesium sulfate in sterile water for injection 2 g/50mL IVPB premix 2 g  2 g Intravenous QAM    labetalol (Normodyne) tab 100 mg  100 mg Oral 2x Daily(Beta Blocker)    losartan (Cozaar) tab 100 mg  100 mg Oral Daily    sodium chloride 0.9% infusion   Intravenous Continuous    polyethylene glycol (PEG 3350) (Miralax) 17 g oral packet 17 g  17 g Oral Daily PRN    sennosides (Senokot) tab 17.2 mg  17.2 mg Oral Nightly PRN    bisacodyl (Dulcolax) 10 MG rectal suppository 10 mg  10 mg Rectal Daily PRN    fleet enema (Fleet) rectal enema 133 mL  1 enema Rectal Once PRN    ondansetron (Zofran) 4 MG/2ML injection 4 mg  4 mg Intravenous Q6H PRN    acetaminophen (Tylenol) tab 650 mg  650 mg Oral Q4H PRN    Or    HYDROcodone-acetaminophen (Norco) 5-325 MG per tab 1 tablet  1 tablet Oral Q4H PRN    Or    HYDROcodone-acetaminophen (Norco) 5-325 MG per tab 2 tablet  2 tablet Oral Q4H PRN       REVIEW OF SYSTEMS:  A 10-point system was reviewed.  Pertinent positives and negatives are noted in HPI.      PHYSICAL EXAMINATION:  VITAL SIGNS: /66 (BP Location: Right arm)   Pulse (!) 41   Temp 97.8 °F  (36.6 °C) (Oral)   Resp 16   Wt (!) 345 lb (156.5 kg)   LMP  (LMP Unknown)   SpO2 95%   BMI 59.22 kg/m²   GENERAL:  Patient is a 35 year old female in no acute distress.  HEENT:  Normocephalic, atraumatic  ABD: Soft, non tender  SKIN: Warm, dry, no rashes    NEUROLOGICAL:   Mental status: Oriented to person, place, and time   Speech: Fluent, no dysarthria  Memory and comprehension: Intact   Cranial Nerves: VFF, PERRL 3mm brisk, EOMI, no nystagmus, facial sensation intact, face symmetric, tongue midline, shoulder shrug equal, remainder CN intact  Motor: No drift, no focal arm or leg weakness   Sensory: Intact to light touch  Coordination: FTN intact  Gait: Deferred      Imaging/Diagnostics:  CTA BRAIN + CTV BRAIN (CPT=70496)    Result Date: 3/24/2025  CONCLUSION: 1. No acute intracranial hemorrhage or hydrocephalus. If there is clinical concern for acute ischemia/infarction, an MRI of the brain would be recommended for further evaluation. 2. There is moderate to severe narrowing at the junction of the transverse and sigmoid sinuses bilaterally.  There is a partially empty sella.  These imaging findings could be seen in the setting of idiopathic intracranial hypertension. 3. No high-grade stenosis, occlusion, or aneurysm is identified within the major intracranial arterial vasculature.    Dictated by (CST): Stromberg, LeRoy, MD on 3/24/2025 at 3:04 PM     Finalized by (CST): Stromberg, LeRoy, MD on 3/24/2025 at 3:15 PM       CT BRAIN OR HEAD (CPT=70450)    Result Date: 3/23/2025  CONCLUSION:  No acute intracranial abnormality.    LOCATION:  [id number]   Dictated by (CST): Salomon Garibay MD on 3/23/2025 at 12:18 PM     Finalized by (CST): Salomon Garibay MD on 3/23/2025 at 12:23 PM          Labs:  Recent Labs   Lab 03/23/25  1047   RBC 4.24   HGB 12.8   HCT 38.2   MCV 90.1   MCH 30.2   MCHC 33.5   RDW 12.7   NEPRELIM 6.46   WBC 11.0   .0         Recent Labs   Lab 03/23/25  1047 03/24/25  0627   * 94   BUN  11 12   CREATSERUM 0.91 0.92   EGFRCR 85 84   CA 10.0 8.9    140   K 3.9 3.7    105   CO2 28.0 24.0       Assessment and Plan:     A 35 years old female with:     Acute non intractable headache - multifactorial etiology, in a setting of remote hx of migraneous headaches, morbid obesity, 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   - 3/25 Migraine cocktails x 3 doses 8 hours apart: Dexamethasone, ketorolac, diphenhydramine, metoclopramide, magnesium sulfate. Additional 2 doses 3/26 and this morning. Headache resolved.   - Advised adequate hydration, nutrition and sleep  - Fall and safety precautions  Viral meningitis - in a setting of likely superimposed migrainous pain.   CT head - no acute findings  LP with CSF studies in ED - elevated WBCs at 165, normal protein and normal glucose. Opening pressure was not measured.    Given the relationship to intense Vasalva at headache onset, pt's morbid obesity and nearly constant pulsatile tinnitus, CTA/CT venogram brain ordered for further investigations. Revealed no acute intracranial hemorrhage or hydrocephalus. Moderate to severe narrowing at the junction of the transverse and sigmoid sinuses bilaterally, a partially empty sella noted. Findings could be seen in the setting of idiopathic intracranial hypertension. No high-grade stenosis, occlusion, or aneurysm is identified within the major intracranial arterial vasculature.   Will have patient follow up with neuro surgery as outpatient for further surveillance and treatment if possible IIH. Discussed with PANKAJ/NSX team.   ID consult - appreciate recs, meningitis/encephalitis serum panel non reactive. Signed off.   HX of HTN - controlled, further management per Medicine  Recent renal Bx IgA nephropathy - follows up with nephrology as outpt, renal function normal this admission.   Discussed with pt, all questions answered. Discussed with dr. Ortiz.  No further inpatient neurological intervention  indicated at this time. Pt may be discharged home, to follow up with Neurology in 4-6 weeks , referral for Dr. Mandel at the headache clinic placed.     Is this a shared or split note between Advanced Practice Provider and Physician? Yes       Princess CRAVEN  [facility]  3/27/2025, 9:33 AM  [facility] # [id number]

## 2025-03-27 NOTE — PLAN OF CARE
NURSING DISCHARGE NOTE    Discharged Home via Ambulatory.  Accompanied by RN  Belongings Taken by patient/family.    Pt received A&Ox4. VSS. Afebrile. All meds per MAR. IVF infusing @100mL/h. No c/o pain. Up ambulating in room. Voiding. Tolerating diet. Cleared for discharge. AVS given with all questions answered. PIV removed. Walked out by this RN. Belongings taken with pt.

## 2025-03-28 ENCOUNTER — PATIENT OUTREACH (OUTPATIENT)
Dept: CASE MANAGEMENT | Age: 35
End: 2025-03-28

## 2025-03-28 NOTE — PAYOR COMM NOTE
Discharge Notification    Patient Name: MO CULP  Payor: [employer]  Subscriber #: [id number]  Authorization Number: [id number]  Admit Date/Time: 3/23/2025 10:34 AM  Discharge Date/Time: 3/27/2025 12:31 PM

## 2025-04-10 ENCOUNTER — OFFICE VISIT (OUTPATIENT)
Dept: NEUROLOGY | Facility: CLINIC | Age: 35
End: 2025-04-10
Payer: COMMERCIAL

## 2025-04-10 VITALS
BODY MASS INDEX: 50.02 KG/M2 | RESPIRATION RATE: 18 BRPM | WEIGHT: 293 LBS | DIASTOLIC BLOOD PRESSURE: 87 MMHG | SYSTOLIC BLOOD PRESSURE: 132 MMHG | HEART RATE: 92 BPM | HEIGHT: 64 IN

## 2025-04-10 DIAGNOSIS — R93.89 ABNORMAL COMPUTED TOMOGRAPHY ANGIOGRAPHY (CTA): Primary | ICD-10-CM

## 2025-04-10 DIAGNOSIS — R51.9 DAILY HEADACHE: ICD-10-CM

## 2025-04-10 DIAGNOSIS — J34.89 NASAL DISCHARGE: ICD-10-CM

## 2025-04-10 DIAGNOSIS — G03.0: ICD-10-CM

## 2025-04-10 PROCEDURE — 3079F DIAST BP 80-89 MM HG: CPT | Performed by: PHYSICIAN ASSISTANT

## 2025-04-10 PROCEDURE — 3075F SYST BP GE 130 - 139MM HG: CPT | Performed by: PHYSICIAN ASSISTANT

## 2025-04-10 PROCEDURE — 3008F BODY MASS INDEX DOCD: CPT | Performed by: PHYSICIAN ASSISTANT

## 2025-04-10 PROCEDURE — 99204 OFFICE O/P NEW MOD 45 MIN: CPT | Performed by: PHYSICIAN ASSISTANT

## 2025-04-10 RX ORDER — RIMEGEPANT SULFATE 75 MG/75MG
75 TABLET, ORALLY DISINTEGRATING ORAL AS NEEDED
Qty: 6 TABLET | Refills: 0 | COMMUNITY
Start: 2025-04-10 | End: 2026-04-10

## 2025-04-10 NOTE — PATIENT INSTRUCTIONS
Refill policies:    Allow 2-3 business days for refills; controlled substances may take longer.  Contact your pharmacy at least 5 days prior to running out of medication and have them send an electronic request or submit request through the “request refill” option in your [employer] account.  Refills are not addressed on weekends; covering physicians do not authorize routine medications on weekends.  No narcotics or controlled substances are refilled after noon on Fridays or by on call physicians.  By law, narcotics must be electronically prescribed.  A 30 day supply with no refills is the maximum allowed.  If your prescription is due for a refill, you may be due for a follow up appointment.  To best provide you care, patients receiving routine medications need to be seen at least once a year.  Patients receiving narcotic/controlled substance medications need to be seen at least once every 3 months.  In the event that your preferred pharmacy does not have the requested medication in stock (e.g. Backordered), it is your responsibility to find another pharmacy that has the requested medication available.  We will gladly send a new prescription to that pharmacy at your request.    Scheduling Tests:    If your physician has ordered radiology tests such as MRI or CT scans, please contact Central Scheduling at [phone] right away to schedule the test.  Once scheduled, the [facility] Centralized Referral Team will work with your insurance carrier to obtain pre-certification or prior authorization.  Depending on your insurance carrier, approval may take 3-10 days.  It is highly recommended patients assure they have received an authorization before having a test performed.  If test is done without insurance authorization, patient may be responsible for the entire amount billed.      Precertification and Prior Authorizations:  If your physician has recommended that you have a procedure or additional testing performed the [facility]  Centralized Referral Team will contact your insurance carrier to obtain pre-certification or prior authorization.    You are strongly encouraged to contact your insurance carrier to verify that your procedure/test has been approved and is a COVERED benefit.  Although the [facility] Centralized Referral Team does its due diligence, the insurance carrier gives the disclaimer that \"Although the procedure is authorized, this does not guarantee payment.\"    Ultimately the patient is responsible for payment.   Thank you for your understanding in this matter.  Paperwork Completion:  If you require FMLA or disability paperwork for your recovery, please make sure to either drop it off or have it faxed to our office at [phone]. Be sure the form has your name and date of birth on it.  The form will be faxed to our Forms Department and they will complete it for you.  There is a 25$ fee for all forms that need to be filled out.  Please be aware there is a 10-14 day turnaround time.  You will need to sign a release of information (LAURYN) form if your paperwork does not come with one.  You may call the Forms Department with any questions at [phone].  Their fax number is [phone].

## 2025-04-10 NOTE — PROGRESS NOTES
NEUROLOGY  [facility]     Previous visit and existing record notes reviewed in preparation for the face to face visit.  Relevant labs and studies reviewed and will be noted in relevant areas of this record.  Denton Collado  2/13/1990  Primary Care Provider:  Kofi Giordano MD    4/10/2025  35 year old female,      Seen for/plans last visit:  Follow-up ER for Aseptic Meninigits  Abnormal CTA/CTV Brain  Daily Headaches    Accompanied visit: No    Present condition:    Patient states that on Friday March 21st she was on break at work and next thing she knew she had the worst headache she has ever experienced, she began vomiting. She went to the ER and by the time she got there she was having trouble speaking and walking. She was given multiple pain medications but nothing was working for the headache. She was discharged    She then returned to the ER 3/23/25 as she was not improving and so she went back to the hospital. She was found to have a fever. She had LP and was diagnosed with viral meningitis. She was transported to [facility] in [place]. She had viral meningitis panel which was negative and diagnosed with aseptic meningitis. During work-up had CTA/CTV which was abnormal.    She has not had a recurrence of a severe headache since discharge from the hospital. Prior to this has not experienced what she would consider a migraine in 10 years  She has headaches that comes and goes. She will get the nausea and vomiting.   She does have hx of headaches for years but not as severe as the one she experience on 3/21/25.  She has daily headaches. The headache is frontal or in bilateral temples. Describes a throbbing pain.  She gets blurry vision in the right eye  She does take tylenol for the headaches frequently and will sometimes combine it with caffeine  Tylenol, benadryl and magnesium- it can help the headache, but makes her drowsy  She thinks she may have taken amitriptyline in the  past  She has an upcoming eye appointment next week    The left nostril has been leaking nasal discharge since the initial headache. She describes it as a clear fluid. Can drain at night when sleeping    Last year had eye exam and there was apparently no evidence of papilledema        Component      Latest Ref Rng 3/23/2025   Specimen Source CSF    Escherichia coli K1      NOT DETECTED  NOT DETECTED    Haemophilus influenzae      NOT DETECTED  NOT DETECTED    Listeria monocytogenes      NOT DETECTED  NOT DETECTED    Neisseria Meningiditis      NOT DETECTED  NOT DETECTED    Streptococcus agalactiae      NOT DETECTED  NOT DETECTED    Streptococcus pneumoniae      NOT DETECTED  NOT DETECTED    Cytomegalovirus (CMV)      NOT DETECTED  NOT DETECTED    Enterovirus (EV)      NOT DETECTED  NOT DETECTED    Herpes Simplex Virus 1 (HSV-1)      NOT DETECTED  NOT DETECTED    Herpes Simplex Virus 2 (HSV-2)      NOT DETECTED  NOT DETECTED    Human Herpesvirus 6 (HHV-6)      NOT DETECTED  NOT DETECTED    Human Parachovirus (HPEV)      NOT DETECTED  NOT DETECTED    Varicella Zoster Virus (VZV)      NOT DETECTED  NOT DETECTED    Cryptococcus species (C. neoformans/C. sacha)      NOT DETECTED  NOT DETECTED    WBC Calculated, CSF      /mm3 165    Neutrophils CSF      % 76    Lymphocytes CSF      % 16    Mono/Macrophages CSF      % 7    EOSINOPHILS CSF      % 1    Basophil CSF      % 0    TOTAL CELLS COUNTED 100    TOTAL VOLUME CSF      mL 3.0    CSF TUBE # 4    Color CSF      Colorless  Colorless    Clarity CSF      Clear  Clear    TNC, CSF      0 - 5 /mm3 165 (H)    RBC CSF      <1 /mm3 34 (H)    Glucose CSF      40 - 70 mg/dL 51    Total Protein CSF      15.0 - 45.0 mg/dL 40.1       Path Comment CSF Predominantly neutrophils with background lymphocytes and monocytes.       CTA/CTV Brain/Neck (3/24/25)      Impression   CONCLUSION:  1. No acute intracranial hemorrhage or hydrocephalus. If there is clinical concern for acute  ischemia/infarction, an MRI of the brain would be recommended for further evaluation.  2. There is moderate to severe narrowing at the junction of the transverse and sigmoid sinuses bilaterally.  There is a partially empty sella.  These imaging findings could be seen in the setting of idiopathic intracranial hypertension.  3. No high-grade stenosis, occlusion, or aneurysm is identified within the major intracranial arterial vasculature.     CT Brain(3/23/25)    Impression   CONCLUSION:  No acute intracranial abnormality.           Component      Latest Ref Rn 3/23/2025   WBC      4.0 - 11.0 x10(3) uL 11.0    RBC      3.80 - 5.30 x10(6)uL 4.24    Hemoglobin      12.0 - 16.0 g/dL 12.8    Hematocrit      35.0 - 48.0 % 38.2    Platelet Count      150.0 - 450.0 10(3)uL 344.0    MCV      80.0 - 100.0 fL 90.1    MCH      26.0 - 34.0 pg 30.2    MCHC      31.0 - 37.0 g/dL 33.5    RDW      % 12.7    Prelim Neutrophil Abs      1.50 - 7.70 x10 (3) uL 6.46    Neutrophils Absolute      1.50 - 7.70 x10(3) uL 6.46    Lymphocytes Absolute      1.00 - 4.00 x10(3) uL 3.56    Monocytes Absolute      0.10 - 1.00 x10(3) uL 0.85    Eosinophils Absolute      0.00 - 0.70 x10(3) uL 0.06    Basophils Absolute      0.00 - 0.20 x10(3) uL 0.05    Immature Granulocyte Absolute      0.00 - 1.00 x10(3) uL 0.03    Neutrophils %      % 58.7    Lymphocytes %      % 32.3    Monocytes %      % 7.7    Eosinophils %      % 0.5    Basophils %      % 0.5    Immature Granulocyte %      % 0.3    Glucose      70 - 99 mg/dL 102 (H)    Sodium      136 - 145 mmol/L 138    Potassium      3.5 - 5.1 mmol/L 3.9    Chloride      98 - 112 mmol/L 103    Carbon Dioxide, Total      21.0 - 32.0 mmol/L 28.0    ANION GAP      0 - 18 mmol/L 7    BUN      9 - 23 mg/dL 11    CREATININE      0.55 - 1.02 mg/dL 0.91    CALCIUM      8.7 - 10.6 mg/dL 10.0    CALCULATED OSMOLALITY      275 - 295 mOsm/kg 286    EGFR      >=60 mL/min/1.73m2 85    AST (SGOT)      <34 U/L 13    ALT  (SGPT)      10 - 49 U/L 15    ALKALINE PHOSPHATASE      37 - 98 U/L 58    Total Bilirubin      0.3 - 1.2 mg/dL 0.5    PROTEIN, TOTAL      5.7 - 8.2 g/dL 7.7    Albumin      3.2 - 4.8 g/dL 4.4    Globulin      2.0 - 3.5 g/dL 3.3    A/G Ratio      1.0 - 2.0  1.3         Review of Systems:  Review of Systems:  Denies systemic symptoms     No CP or SOB.  No GI or  symptoms. Relevant Neuro as noted above.    Past Medical History[1]    Medications:    Medications - Current[2]  PRN: PRN Medications[3]    Allergies:  Allergies[4]       EXAM:  /87 (BP Location: Left arm, Patient Position: Sitting, Cuff Size: large)   Pulse 92   Resp 18   Ht 64\"   Wt (!) 341 lb 9.6 oz (154.9 kg)   LMP  (LMP Unknown)   BMI 58.64 kg/m²   Looks stated age  General Exam:  HENT:  pink conjunctiva anicteric sclerae  Neck no adenopathy, thyroid normal  Heart and Lungs:  normal  Extremities: no cyanosis, skin changes    NEURO  NAD, A&Ox3  EOMI  CN II-XII intact  Strength 5/5 all extremities  DTRs 2+ and symmetric  FTN intact bilaterally  No drift on exam  Normal gait    Problem/s Identified this visit:   1. Abnormal computed tomography angiography (CTA)    2. Daily headache    3. Nasal discharge    4. Aseptic meningitis (HCC)          Discussion plus Diagnostics & Treatment Orders:    Patient is a 35 year old female here for follow-up from the hospital for aseptic meningitis and abnormal CTA/CTV. Since the hospital visit there has not been a recurrence of the severe headaches. She continues to have daily headaches and CTA/CTV findings are concerning for pseudotumor. Patient instructed to proceed with eye exam next week to evaluate for papilledema. Will get MRI Brain to evaluate for findings of possible intracranial hypertension or hypotension. Will start her on tizanidine 4mg nightly for headache prevention. Samples of nurtec given to try for abortive tx. For the nasal discharge recommended CT Sinus and ENT consult. She expressed full  understanding.    (X) Discussed potential side effects of any treatment relevant to above.  Includes explanation of tests as necessary.    Return in about 4 weeks (around 5/8/2025).      Patient understands that if needed, based on condition and or test results, follow up will be readjusted    Emili Marin PA-C  [facility]  4/10/2025, Time completed 11:13 AM    Patient was seen and examined by Emili Marin PA-C. Plan was discussed with Dr. Robles. Plan was discussed with patient and she expressed full understanding             [1]   Past Medical History:   Essential hypertension    IgA nephropathy    Migraines    PCOS (polycystic ovarian syndrome)   [2]   Current Outpatient Medications:     Empagliflozin (JARDIANCE OR), Take by mouth., Disp: , Rfl:     tiZANidine 4 MG Oral Tab, Take 1 tab po qhs, Disp: 30 tablet, Rfl: 2    Rimegepant Sulfate (NURTEC) 75 MG Oral Tablet Dispersible, Take 75 mg by mouth as needed. Take one tablet at onset of migraine.  Maximum dose in 24 hours is 1 tablet (75mg)., Disp: 6 tablet, Rfl: 0    losartan 50 MG Oral Tab, Take 2 tablets (100 mg total) by mouth daily. Reports taking 1 tablet daily as needed for hypertension, Disp: , Rfl:     labetalol 100 MG Oral Tab, Take 1 tablet (100 mg total) by mouth daily. Reports taking daily as needed, Disp: , Rfl:   [3] [4]   Allergies  Allergen Reactions    Lisinopril Coughing

## 2025-04-14 ENCOUNTER — TELEPHONE (OUTPATIENT)
Dept: NEUROLOGY | Facility: CLINIC | Age: 35
End: 2025-04-14

## 2025-04-14 NOTE — TELEPHONE ENCOUNTER
Patient stated to fax imaging and ENT orders to her primary provider  Dr. Giordano  Fax:  [phone]    Fax sent & confirmed

## 2025-06-27 ENCOUNTER — HOSPITAL ENCOUNTER (EMERGENCY)
Age: 35
Discharge: HOME | End: 2025-06-27
Attending: EMERGENCY MEDICINE
Payer: COMMERCIAL

## 2025-06-27 ENCOUNTER — APPOINTMENT (OUTPATIENT)
Dept: CT IMAGING | Age: 35
End: 2025-06-27
Attending: EMERGENCY MEDICINE
Payer: COMMERCIAL

## 2025-06-27 VITALS
DIASTOLIC BLOOD PRESSURE: 65 MMHG | HEART RATE: 70 BPM | TEMPERATURE: 98 F | OXYGEN SATURATION: 99 % | HEIGHT: 64 IN | RESPIRATION RATE: 16 BRPM | BODY MASS INDEX: 50.02 KG/M2 | SYSTOLIC BLOOD PRESSURE: 110 MMHG | WEIGHT: 293 LBS

## 2025-06-27 DIAGNOSIS — R10.9 LEFT FLANK PAIN: Primary | ICD-10-CM

## 2025-06-27 DIAGNOSIS — R31.9 HEMATURIA, UNSPECIFIED TYPE: ICD-10-CM

## 2025-06-27 LAB
ANION GAP SERPL CALC-SCNC: 6 MMOL/L (ref 0–18)
B-HCG UR QL: NEGATIVE
BASOPHILS # BLD AUTO: 0.06 X10(3) UL (ref 0–0.2)
BASOPHILS NFR BLD AUTO: 0.7 %
BILIRUB UR QL STRIP.AUTO: NEGATIVE
BUN BLD-MCNC: 15 MG/DL (ref 9–23)
BUN BLD-MCNC: 16 MG/DL (ref 7–18)
CALCIUM BLD-MCNC: 9.7 MG/DL (ref 8.7–10.6)
CHLORIDE BLD-SCNC: 106 MMOL/L (ref 98–112)
CHLORIDE SERPL-SCNC: 105 MMOL/L (ref 98–112)
CO2 BLD-SCNC: 25 MMOL/L (ref 21–32)
CO2 SERPL-SCNC: 27 MMOL/L (ref 21–32)
COLOR UR AUTO: YELLOW
CREAT BLD-MCNC: 0.89 MG/DL (ref 0.55–1.02)
CREAT BLD-MCNC: 0.9 MG/DL (ref 0.55–1.02)
EGFRCR SERPLBLD CKD-EPI 2021: 86 ML/MIN/1.73M2 (ref 60–?)
EGFRCR SERPLBLD CKD-EPI 2021: 87 ML/MIN/1.73M2 (ref 60–?)
EOSINOPHIL # BLD AUTO: 0.35 X10(3) UL (ref 0–0.7)
EOSINOPHIL NFR BLD AUTO: 4 %
ERYTHROCYTE [DISTWIDTH] IN BLOOD BY AUTOMATED COUNT: 13 %
GLUCOSE BLD-MCNC: 82 MG/DL (ref 70–99)
GLUCOSE BLD-MCNC: 85 MG/DL (ref 70–99)
GLUCOSE UR STRIP.AUTO-MCNC: >=1000 MG/DL
HCT VFR BLD AUTO: 39.2 % (ref 35–48)
HCT VFR BLD CALC: 38 % (ref 34–50)
HGB BLD-MCNC: 13.1 G/DL (ref 12–16)
IMM GRANULOCYTES # BLD AUTO: 0.03 X10(3) UL (ref 0–1)
IMM GRANULOCYTES NFR BLD: 0.3 %
ISTAT IONIZED CALCIUM FOR CHEM 8: 1.26 MMOL/L (ref 1.12–1.32)
KETONES UR STRIP.AUTO-MCNC: NEGATIVE MG/DL
LEUKOCYTE ESTERASE UR QL STRIP.AUTO: NEGATIVE
LYMPHOCYTES # BLD AUTO: 3.22 X10(3) UL (ref 1–4)
LYMPHOCYTES NFR BLD AUTO: 37.2 %
MCH RBC QN AUTO: 29.9 PG (ref 26–34)
MCHC RBC AUTO-ENTMCNC: 33.4 G/DL (ref 31–37)
MCV RBC AUTO: 89.5 FL (ref 80–100)
MONOCYTES # BLD AUTO: 0.59 X10(3) UL (ref 0.1–1)
MONOCYTES NFR BLD AUTO: 6.8 %
NEUTROPHILS # BLD AUTO: 4.4 X10 (3) UL (ref 1.5–7.7)
NEUTROPHILS # BLD AUTO: 4.4 X10(3) UL (ref 1.5–7.7)
NEUTROPHILS NFR BLD AUTO: 51 %
NITRITE UR QL STRIP.AUTO: NEGATIVE
OSMOLALITY SERPL CALC.SUM OF ELEC: 286 MOSM/KG (ref 275–295)
PH UR STRIP.AUTO: 6 [PH] (ref 5–8)
PLATELET # BLD AUTO: 326 10(3)UL (ref 150–450)
POTASSIUM BLD-SCNC: 4.3 MMOL/L (ref 3.6–5.1)
POTASSIUM SERPL-SCNC: 4.3 MMOL/L (ref 3.5–5.1)
RBC # BLD AUTO: 4.38 X10(6)UL (ref 3.8–5.3)
SODIUM BLD-SCNC: 139 MMOL/L (ref 136–145)
SODIUM SERPL-SCNC: 138 MMOL/L (ref 136–145)
SP GR UR STRIP.AUTO: 1.01 (ref 1–1.03)
UROBILINOGEN UR STRIP.AUTO-MCNC: 0.2 MG/DL
WBC # BLD AUTO: 8.7 X10(3) UL (ref 4–11)

## 2025-06-27 PROCEDURE — 74176 CT ABD & PELVIS W/O CONTRAST: CPT | Performed by: EMERGENCY MEDICINE

## 2025-06-27 PROCEDURE — 80048 BASIC METABOLIC PNL TOTAL CA: CPT | Performed by: EMERGENCY MEDICINE

## 2025-06-27 PROCEDURE — 96374 THER/PROPH/DIAG INJ IV PUSH: CPT

## 2025-06-27 PROCEDURE — 81025 URINE PREGNANCY TEST: CPT

## 2025-06-27 PROCEDURE — 81001 URINALYSIS AUTO W/SCOPE: CPT | Performed by: EMERGENCY MEDICINE

## 2025-06-27 PROCEDURE — 96361 HYDRATE IV INFUSION ADD-ON: CPT

## 2025-06-27 PROCEDURE — 81015 MICROSCOPIC EXAM OF URINE: CPT | Performed by: EMERGENCY MEDICINE

## 2025-06-27 PROCEDURE — 85025 COMPLETE CBC W/AUTO DIFF WBC: CPT | Performed by: EMERGENCY MEDICINE

## 2025-06-27 PROCEDURE — 96375 TX/PRO/DX INJ NEW DRUG ADDON: CPT

## 2025-06-27 PROCEDURE — 99284 EMERGENCY DEPT VISIT MOD MDM: CPT

## 2025-06-27 PROCEDURE — 80047 BASIC METABLC PNL IONIZED CA: CPT

## 2025-06-27 RX ORDER — SODIUM CHLORIDE 9 MG/ML
125 INJECTION, SOLUTION INTRAVENOUS CONTINUOUS
Status: DISCONTINUED | OUTPATIENT
Start: 2025-06-27 | End: 2025-06-27

## 2025-06-27 RX ORDER — MORPHINE SULFATE 4 MG/ML
2 INJECTION, SOLUTION INTRAMUSCULAR; INTRAVENOUS ONCE
Status: DISCONTINUED | OUTPATIENT
Start: 2025-06-27 | End: 2025-06-27

## 2025-06-27 RX ORDER — ACETAMINOPHEN 500 MG
1000 TABLET ORAL ONCE
Status: COMPLETED | OUTPATIENT
Start: 2025-06-27 | End: 2025-06-27

## 2025-06-27 RX ORDER — KETOROLAC TROMETHAMINE 15 MG/ML
15 INJECTION, SOLUTION INTRAMUSCULAR; INTRAVENOUS ONCE
Status: DISCONTINUED | OUTPATIENT
Start: 2025-06-27 | End: 2025-06-27

## 2025-06-27 NOTE — ED PROVIDER NOTES
Patient Seen in: [facility] In [place]        History  No chief complaint on file.    Stated Complaint: Left flank pain- radiates to abd since yesterday    Subjective:   HPI            35-year-old female presents emergency room for evaluation of left-sided flank pain started yesterday, pain will radiate towards her left lower abdomen, admits to some nausea with the pain but denies vomiting.  Denies diarrhea.  Denies urinary symptoms.  Denies any fevers or chills.  Scant excess abdominal pain.  Denies any trauma.      Objective:     Past Medical History:    Essential hypertension    IgA nephropathy    Migraines    PCOS (polycystic ovarian syndrome)              Past Surgical History:   Procedure Laterality Date    Excis lumbar disk,one level      Prior classical                   Social History     Socioeconomic History    Marital status:    Tobacco Use    Smoking status: Passive Smoke Exposure - Never Smoker    Smokeless tobacco: Never   Vaping Use    Vaping status: Never Used   Substance and Sexual Activity    Alcohol use: Not Currently     Comment: social    Drug use: Never   Other Topics Concern    Caffeine Concern Yes     Comment: 1cup daily in the morning     Social Drivers of Health     Food Insecurity: No Food Insecurity (3/23/2025)    NCSS - Food Insecurity     Worried About Running Out of Food in the Last Year: No     Ran Out of Food in the Last Year: No   Transportation Needs: No Transportation Needs (3/23/2025)    NCSS - Transportation     Lack of Transportation: No   Housing Stability: Not At Risk (3/23/2025)    NCSS - Housing/Utilities     Has Housing: Yes     Worried About Losing Housing: No     Unable to Get Utilities: No                                Physical Exam    ED Triage Vitals [25 1053]   BP (!) 161/93   Pulse 78   Resp 16   Temp 97.7 °F (36.5 °C)   Temp src Oral   SpO2 98 %   O2 Device None (Room air)       Current Vitals:   Vital Signs  BP:  110/65  Pulse: 70  Resp: 16  Temp: 97.7 °F (36.5 °C)  Temp src: Oral    Oxygen Therapy  SpO2: 99 %  O2 Device: None (Room air)            Physical Exam  GENERAL: Patient is awake, alert, well-appearing, in no acute distress.  HEENT:  no scleral icterus.  Mucous membranes are moist  Back: No midline thoracic or lumbar spine tenderness.  There is tenderness to musculature to the left flank with palpation.  HEART: Regular rate and rhythm, no murmurs.  LUNGS: Clear to auscultation bilaterally.  No Rales, no rhonchi, no wheezing, no stridor.  ABDOMEN: Soft, nondistended,non tender, bowel sounds are present, no rebound, no rigidity, no guarding.no pulsatile masses. No CVA tenderness  Back: No midline thoracic or lumbar spine tenderness  EXTREMITIES: No peripheral edema, no calf tenderness        ED Course  Labs Reviewed   URINALYSIS WITH CULTURE REFLEX - Abnormal; Notable for the following components:       Result Value    Clarity Urine Hazy (*)     Glucose Urine >=1000 (*)     Blood Urine Moderate (*)     Protein Urine 100 mg/dL (*)     All other components within normal limits   UA MICROSCOPIC ONLY, URINE - Abnormal; Notable for the following components:    RBC Urine 3-5 (*)     Squamous Epi. Cells Few (*)     All other components within normal limits   POCT PREGNANCY URINE - Normal   CBC WITH DIFFERENTIAL WITH PLATELET   BASIC METABOLIC PANEL (8)   POCT ISTAT CHEM8 CARTRIDGE                            MDM       Differential diagnosis before testing includes but not limited to nephrolithiasis/urolithiasis, AAA, muscular spasm/strain, urinary tract infection, which is a medical condition that poses a threat to life/function    Radiographic images  I personally reviewed the radiographs and my individual interpretation shows CT, no free air noted  I also reviewed the official reports that showed 1.  No acute abdominal or pelvic pathology.   2.  Tiny fat-containing umbilical hernia.   3.  L4-5 and L5-S1 degenerative disc  disease.     Course of Events during Emergency Room Visit include IV established, blood work obtained.  CBC and chemistry unremarkable, CT performed, no acute findings were noted.  Urinalysis did reveal moderate blood negative nitrate esterase.  Patient denied any the pain medication, discussed all results with patient, discussed possibility of muscular pain versus nonvisualized kidney stone, I do recommend follow-up with primary care as well as urology, patient does have nephrologist who also recommend follow-up.  Return to ER if any change or worsening symptoms.  May alternate ibuprofen or Tylenol for pain.  Ice affected area.    Shared decision making was utilized             Disposition:        Discharge  I have discussed with the patient the results of test, differential diagnosis, treatment plan, warning signs and symptoms which should prompt immediate return.  They expressed understanding of these instructions and agrees to the following plan provided.  They were given written discharge instructions and agrees to return for any concerns and voiced understanding and all questions were answered.    Note to patient: The 21st Century Cures Act makes medical notes like these available to patients in the interest of transparency. However, this is a medical document intended as peer to peer communication. It is written in medical language and may contain abbreviations or verbiage that are unfamiliar. It may appear blunt or direct. Medical documents are intended to carry relevant information, facts as evident, and the clinical opinion of the practitioner.                 Medical Decision Making      Disposition and Plan     Clinical Impression:  1. Left flank pain    2. Hematuria, unspecified type         Disposition:  Discharge  6/27/2025 12:09 pm    Follow-up:  Kofi Giordano MD  [address] [id number] [phone]    Follow up in 2 day(s)      Jamel Hoff MD  [address]  [facility] [address]  [phone]    Follow up in 2 day(s)            Medications Prescribed:  Discharge Medication List as of 6/27/2025 12:12 PM                Supplementary Documentation:

## 2025-06-27 NOTE — ED INITIAL ASSESSMENT (HPI)
Left flank pain started yesterday and radiates to left lower abdom, nausea, denies vomiting/fevers/urinary symptoms, history of kidney disease

## 2025-07-07 ENCOUNTER — OFFICE VISIT (OUTPATIENT)
Dept: FAMILY MEDICINE CLINIC | Facility: CLINIC | Age: 35
End: 2025-07-07
Payer: COMMERCIAL

## 2025-07-07 VITALS
BODY MASS INDEX: 50.02 KG/M2 | DIASTOLIC BLOOD PRESSURE: 80 MMHG | WEIGHT: 293 LBS | SYSTOLIC BLOOD PRESSURE: 110 MMHG | TEMPERATURE: 97 F | HEIGHT: 64 IN | OXYGEN SATURATION: 98 % | HEART RATE: 78 BPM | RESPIRATION RATE: 16 BRPM

## 2025-07-07 DIAGNOSIS — N28.9 KIDNEY DISORDER: Primary | ICD-10-CM

## 2025-07-07 DIAGNOSIS — R09.89 RUNNY NOSE: ICD-10-CM

## 2025-07-07 DIAGNOSIS — A87.9: ICD-10-CM

## 2025-07-07 DIAGNOSIS — R51.9 ACUTE INTRACTABLE HEADACHE, UNSPECIFIED HEADACHE TYPE: ICD-10-CM

## 2025-07-07 DIAGNOSIS — I10 ESSENTIAL HYPERTENSION: ICD-10-CM

## 2025-07-07 PROBLEM — N92.6 IRREGULAR MENSES: Status: ACTIVE | Noted: 2025-07-07

## 2025-07-07 PROCEDURE — G2211 COMPLEX E/M VISIT ADD ON: HCPCS | Performed by: FAMILY MEDICINE

## 2025-07-07 PROCEDURE — 3074F SYST BP LT 130 MM HG: CPT | Performed by: FAMILY MEDICINE

## 2025-07-07 PROCEDURE — 3079F DIAST BP 80-89 MM HG: CPT | Performed by: FAMILY MEDICINE

## 2025-07-07 PROCEDURE — 99203 OFFICE O/P NEW LOW 30 MIN: CPT | Performed by: FAMILY MEDICINE

## 2025-07-07 PROCEDURE — 3008F BODY MASS INDEX DOCD: CPT | Performed by: FAMILY MEDICINE

## 2025-07-07 RX ORDER — COVID-19 ANTIGEN TEST
KIT MISCELLANEOUS
COMMUNITY

## 2025-07-07 NOTE — PROGRESS NOTES
Denton Collado is a 35 year old female.   Chief Complaint   Patient presents with    [facility]    Referral     Nephrologist, neurologist, ENT,      Medication Follow-Up     Go over meds.      HPI:    35-year-old female comes in to establish care.  Patient states that she was under the care of an ear nose and throat nephrologist and neurologist for her medical conditions.  Patient was previously seen under the [facility].     Patient states that she has a history of abnormal migraines and a history of viral meningitis for which she is supposed to be following up with neurology    Patient states she has an ongoing nasal drainage for which she is requesting to have a visit with ear nose and throat.  Patient states she started over-the-counter medication Flonase antihistamines without any significant improvement.    Patient states also that she has a history of kidney problems which she is not able to articulate properly but says that she does follow-up with a specialist for her kidneys and she needs to have that continued as well.    Patient also has a history of hypertension for which she is taken losartan 100 mg daily and tolerating well no chest pain shortness of breath or any palpitations.  Past Medical History[1]  Past Surgical History[2]  Family History[3]  Social History:  Short Social Hx on File[4]  Allergies:  Allergies[5]   Current Meds:  Current Medications[6]     ROS:   GENERAL HEALTH: feels well otherwise  SKIN: denies any unusual skin lesions or rashes  RESPIRATORY: denies shortness of breath with exertion  CARDIOVASCULAR: denies chest pain on exertion  GI: denies abdominal pain and denies heartburn  NEURO: See HPI    PHYSICAL EXAM:   /80 (BP Location: Left arm, Patient Position: Sitting, Cuff Size: adult)   Pulse 78   Temp 96.9 °F (36.1 °C)   Resp 16   Ht 5' 4\" (1.626 m)   Wt (!) 346 lb (156.9 kg)   LMP 06/12/2025   SpO2 98%   BMI 59.39 kg/m²   GENERAL HEALTH: well  developed, obese female with history as noted in no apparent distress  EYES: sclera anicteric, conjunctiva normal  HEENT: normocephalic; normal pharynx  NECK: supple; no JVD, no LAD  RESPIRATORY: clear to auscultation bilaterally, no tachypnea  CARDIOVASCULAR: S1, S2 normal, no S3, no S4; no click; no murmur  EXTREMITIES: no cyanosis, clubbing or edema, peripheral pulses intact  PSYCHIATRIC: alert and oriented x 3; affect appropriate      ASSESSMENT/ PLAN:     Diagnoses and all orders for this visit:    Kidney disorder  -     Nephrology Referral - In Network    Viral meningitis (HCC)  -     Neuro Referral - In Network    Runny nose  -     ENT Referral - In Network    Essential hypertension    Acute intractable headache, unspecified headache type    Long discussion with the patient regarding her needs.  Will get referrals lined up as requested.  Information on epic via Care Everywhere.    The patient is to return to office in as needed physical fall last 2024  The patient is to return to office for persistent or worsening signs and symptoms.   The proper use of medication and possible side effects discussed with patient.  An AVS was given to patient.  The patient verbalized understanding, agrees to treatment regimen and all questions were answered.        [1]   Past Medical History:   Essential hypertension    IgA nephropathy    Migraines    PCOS (polycystic ovarian syndrome)   [2]   Past Surgical History:  Procedure Laterality Date    Excis lumbar disk,one level      Prior classical      [3]   Family History  Problem Relation Age of Onset    Diabetes Mother     Diabetes Maternal Grandmother     Cataracts Maternal Grandfather     Kidney Disease Maternal Aunt     Other (kidney disease) Maternal Aunt     Kidney Disease Maternal Cousin Male    [4]   Social History  Socioeconomic History    Marital status:    Tobacco Use    Smoking status: Never     Passive exposure: Yes    Smokeless tobacco: Never    Vaping Use    Vaping status: Never Used   Substance and Sexual Activity    Alcohol use: Not Currently     Comment: social    Drug use: Never   Other Topics Concern    Caffeine Concern Yes     Comment: 1cup daily in the morning     Social Drivers of Health     Food Insecurity: No Food Insecurity (3/23/2025)    NCSS - Food Insecurity     Worried About Running Out of Food in the Last Year: No     Ran Out of Food in the Last Year: No   Transportation Needs: No Transportation Needs (3/23/2025)    NCSS - Transportation     Lack of Transportation: No   Housing Stability: Not At Risk (3/23/2025)    NCSS - Housing/Utilities     Has Housing: Yes     Worried About Losing Housing: No     Unable to Get Utilities: No   [5]   Allergies  Allergen Reactions    Lisinopril Coughing   [6]   Current Outpatient Medications   Medication Sig Dispense Refill    Acetaminophen 500 MG Oral Cap Take by mouth As Directed.      Naproxen Sodium (ALEVE) 220 MG Oral Cap Aleve      Empagliflozin (JARDIANCE OR) Take by mouth.      tiZANidine 4 MG Oral Tab Take 1 tab po qhs 30 tablet 2    losartan 50 MG Oral Tab Take 2 tablets (100 mg total) by mouth in the morning. Reports taking 1 tablet daily as needed for hypertension.

## 2025-07-31 ENCOUNTER — OFFICE VISIT (OUTPATIENT)
Dept: NEPHROLOGY | Facility: CLINIC | Age: 35
End: 2025-07-31
Payer: COMMERCIAL

## 2025-07-31 VITALS — WEIGHT: 293 LBS | DIASTOLIC BLOOD PRESSURE: 80 MMHG | BODY MASS INDEX: 60 KG/M2 | SYSTOLIC BLOOD PRESSURE: 120 MMHG

## 2025-07-31 DIAGNOSIS — R80.9 PROTEINURIA, UNSPECIFIED TYPE: ICD-10-CM

## 2025-07-31 DIAGNOSIS — N05.1 FSGS (FOCAL SEGMENTAL GLOMERULOSCLEROSIS): ICD-10-CM

## 2025-07-31 DIAGNOSIS — N02.B9 IGA NEPHROPATHY: Primary | ICD-10-CM

## 2025-07-31 PROCEDURE — 99204 OFFICE O/P NEW MOD 45 MIN: CPT | Performed by: INTERNAL MEDICINE

## 2025-07-31 PROCEDURE — 3079F DIAST BP 80-89 MM HG: CPT | Performed by: INTERNAL MEDICINE

## 2025-07-31 PROCEDURE — 3074F SYST BP LT 130 MM HG: CPT | Performed by: INTERNAL MEDICINE

## 2025-08-05 ENCOUNTER — PATIENT MESSAGE (OUTPATIENT)
Dept: NEPHROLOGY | Facility: CLINIC | Age: 35
End: 2025-08-05

## 2025-08-20 ENCOUNTER — TELEPHONE (OUTPATIENT)
Dept: NEPHROLOGY | Facility: CLINIC | Age: 35
End: 2025-08-20

## 2025-08-21 ENCOUNTER — OFFICE VISIT (OUTPATIENT)
Dept: FAMILY MEDICINE CLINIC | Facility: CLINIC | Age: 35
End: 2025-08-21

## 2025-08-21 VITALS
OXYGEN SATURATION: 97 % | BODY MASS INDEX: 50.02 KG/M2 | TEMPERATURE: 97 F | SYSTOLIC BLOOD PRESSURE: 110 MMHG | RESPIRATION RATE: 16 BRPM | WEIGHT: 293 LBS | HEART RATE: 86 BPM | HEIGHT: 64 IN | DIASTOLIC BLOOD PRESSURE: 78 MMHG

## 2025-08-21 DIAGNOSIS — I10 ESSENTIAL HYPERTENSION: ICD-10-CM

## 2025-08-21 DIAGNOSIS — Z13.220 LIPID SCREENING: ICD-10-CM

## 2025-08-21 DIAGNOSIS — E66.813 CLASS 3 SEVERE OBESITY WITH SERIOUS COMORBIDITY AND BODY MASS INDEX (BMI) OF 50.0 TO 59.9 IN ADULT, UNSPECIFIED OBESITY TYPE: ICD-10-CM

## 2025-08-21 DIAGNOSIS — Z00.00 WELLNESS EXAMINATION: Primary | ICD-10-CM

## 2025-08-23 ENCOUNTER — LAB ENCOUNTER (OUTPATIENT)
Dept: LAB | Age: 35
End: 2025-08-23
Attending: FAMILY MEDICINE

## 2025-08-23 DIAGNOSIS — Z13.220 LIPID SCREENING: ICD-10-CM

## 2025-08-23 DIAGNOSIS — Z00.00 WELLNESS EXAMINATION: ICD-10-CM

## 2025-08-23 LAB
ALBUMIN SERPL-MCNC: 4.3 G/DL (ref 3.2–4.8)
ALBUMIN/GLOB SERPL: 1.4 (ref 1–2)
ALP LIVER SERPL-CCNC: 67 U/L (ref 37–98)
ALT SERPL-CCNC: 31 U/L (ref 10–49)
ANION GAP SERPL CALC-SCNC: 11 MMOL/L (ref 0–18)
AST SERPL-CCNC: 29 U/L (ref ?–34)
BILIRUB SERPL-MCNC: 0.3 MG/DL (ref 0.3–1.2)
BUN BLD-MCNC: 14 MG/DL (ref 9–23)
CALCIUM BLD-MCNC: 10 MG/DL (ref 8.7–10.6)
CHLORIDE SERPL-SCNC: 106 MMOL/L (ref 98–112)
CHOLEST SERPL-MCNC: 127 MG/DL (ref ?–200)
CO2 SERPL-SCNC: 26 MMOL/L (ref 21–32)
CREAT BLD-MCNC: 1.06 MG/DL (ref 0.55–1.02)
EGFRCR SERPLBLD CKD-EPI 2021: 71 ML/MIN/1.73M2 (ref 60–?)
FASTING PATIENT LIPID ANSWER: YES
FASTING STATUS PATIENT QL REPORTED: YES
GLOBULIN PLAS-MCNC: 3.1 G/DL (ref 2–3.5)
GLUCOSE BLD-MCNC: 107 MG/DL (ref 70–99)
HDLC SERPL-MCNC: 46 MG/DL (ref 40–59)
LDLC SERPL CALC-MCNC: 57 MG/DL (ref ?–100)
NONHDLC SERPL-MCNC: 81 MG/DL (ref ?–130)
OSMOLALITY SERPL CALC.SUM OF ELEC: 297 MOSM/KG (ref 275–295)
POTASSIUM SERPL-SCNC: 4 MMOL/L (ref 3.5–5.1)
PROT SERPL-MCNC: 7.4 G/DL (ref 5.7–8.2)
SODIUM SERPL-SCNC: 143 MMOL/L (ref 136–145)
TRIGL SERPL-MCNC: 141 MG/DL (ref 30–149)
VLDLC SERPL CALC-MCNC: 21 MG/DL (ref 0–30)

## 2025-08-23 PROCEDURE — 80053 COMPREHEN METABOLIC PANEL: CPT

## 2025-08-23 PROCEDURE — 36415 COLL VENOUS BLD VENIPUNCTURE: CPT

## 2025-08-23 PROCEDURE — 80061 LIPID PANEL: CPT

## (undated) NOTE — ED AVS SNAPSHOT
Efra Molly   MRN: [id number]    Department:  [facility] Emergency Department   Date of Visit:  11/13/2019           Disclosure     Insurance plans vary and the physician(s) referred by the ER may not be covered by your plan.  Please cont tell this physician (or your personal doctor if your instructions are to return to your personal doctor) about any new or lasting problems. The primary care or specialist physician will see patients referred from the [facility] Emergency Department.  Herb Martinez

## (undated) NOTE — LETTER
04/10/25      Denton Collado  :  1990      To Whom It May Concern:    This patient was seen in our office on 04/10/25 .  Work status:  May return to work full-time, no restrictions    May return to work status per above effective 25.    If this office may be of further assistance, please do not hesitate to contact us.      Sincerely,      Emili Marin PA-C

## (undated) NOTE — ED AVS SNAPSHOT
Marcelo Rivera   MRN: [id number]    Department:  [facility] Emergency Department   Date of Visit:  1/9/2018           Disclosure     Insurance plans vary and the physician(s) referred by the ER may not be covered by your plan.  Please contac tell this physician (or your personal doctor if your instructions are to return to your personal doctor) about any new or lasting problems. The primary care or specialist physician will see patients referred from the [facility] Emergency Department.  Oj De Leon

## (undated) NOTE — ED AVS SNAPSHOT
[facility] Emergency Department  [facility]  [address] [address]  Phone:  [phone]  Fax:  [address]    MRN: [id number]    Department:  [facility] Emergency Department   Date of Visit:  7/4/2017 IF THERE IS ANY CHANGE OR WORSENING OF YOUR CONDITION, CALL YOUR PRIMARY CARE PHYSICIAN AT ONCE OR RETURN IMMEDIATELY TO THE EMERGENCY DEPARTMENT.     If you have been prescribed any medication(s), please fill your prescription right away and begin taking t

## (undated) NOTE — ED AVS SNAPSHOT
Dany De La Cruz   MRN: [id number]    Department:  [facility] Emergency Department   Date of Visit:  10/20/2019           Disclosure     Insurance plans vary and the physician(s) referred by the ER may not be covered by your plan.  Please cont tell this physician (or your personal doctor if your instructions are to return to your personal doctor) about any new or lasting problems. The primary care or specialist physician will see patients referred from the [facility] Emergency Department.  Herb Martinez

## (undated) NOTE — LETTER
Date & Time: 3/27/2024, 9:56 PM  Patient: Denton Collado  Encounter Provider(s):    Jalen Krause MD       To Whom It May Concern:    Denton Collado was seen and treated in our department on 3/27/2024. She should not return to work until 03/28/2024 .    If you have any questions or concerns, please do not hesitate to call.        _____________________________  Physician/APC Signature